# Patient Record
Sex: FEMALE | Race: WHITE | Employment: UNEMPLOYED | URBAN - METROPOLITAN AREA
[De-identification: names, ages, dates, MRNs, and addresses within clinical notes are randomized per-mention and may not be internally consistent; named-entity substitution may affect disease eponyms.]

---
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
## 2020-01-06 LAB
GLUCOSE CHALLENGE: 100 MG/DL
HCT VFR BLD CALC: 29.8 % (ref 36–48)
HEMOGLOBIN: 9.7 G/DL (ref 12–16)
MCH RBC QN AUTO: 28.3 PG (ref 26–34)
MCHC RBC AUTO-ENTMCNC: 32.7 G/DL (ref 31–36)
MCV RBC AUTO: 86.5 FL (ref 80–100)
PDW BLD-RTO: 13.6 % (ref 12.4–15.4)
PLATELET # BLD: 292 K/UL (ref 135–450)
PMV BLD AUTO: 8.6 FL (ref 5–10.5)
RBC # BLD: 3.44 M/UL (ref 4–5.2)
WBC # BLD: 12.1 K/UL (ref 4–11)

## 2020-02-26 LAB
FERRITIN: 19.1 NG/ML (ref 15–150)
HCT VFR BLD CALC: 28.3 % (ref 36–48)
HEMOGLOBIN: 9.3 G/DL (ref 12–16)
MCH RBC QN AUTO: 26.7 PG (ref 26–34)
MCHC RBC AUTO-ENTMCNC: 32.9 G/DL (ref 31–36)
MCV RBC AUTO: 81.1 FL (ref 80–100)
PDW BLD-RTO: 13.7 % (ref 12.4–15.4)
PLATELET # BLD: 292 K/UL (ref 135–450)
PMV BLD AUTO: 8.2 FL (ref 5–10.5)
RBC # BLD: 3.48 M/UL (ref 4–5.2)
WBC # BLD: 14.7 K/UL (ref 4–11)

## 2020-03-01 ENCOUNTER — HOSPITAL ENCOUNTER (OUTPATIENT)
Age: 20
Discharge: HOME | End: 2020-03-01
Attending: OBSTETRICS & GYNECOLOGY | Admitting: OBSTETRICS & GYNECOLOGY
Payer: COMMERCIAL

## 2020-03-01 VITALS
TEMPERATURE: 97.8 F | RESPIRATION RATE: 18 BRPM | SYSTOLIC BLOOD PRESSURE: 113 MMHG | DIASTOLIC BLOOD PRESSURE: 58 MMHG | HEART RATE: 104 BPM

## 2020-03-01 PROCEDURE — 96365 THER/PROPH/DIAG IV INF INIT: CPT

## 2020-03-01 PROCEDURE — 6360000002 HC RX W HCPCS: Performed by: OBSTETRICS & GYNECOLOGY

## 2020-03-01 PROCEDURE — 2580000003 HC RX 258: Performed by: OBSTETRICS & GYNECOLOGY

## 2020-03-01 RX ORDER — SODIUM CHLORIDE 9 MG/ML
INJECTION, SOLUTION INTRAVENOUS CONTINUOUS
Status: DISCONTINUED | OUTPATIENT
Start: 2020-03-01 | End: 2020-03-01 | Stop reason: HOSPADM

## 2020-03-01 RX ADMIN — IRON SUCROSE 200 MG: 20 INJECTION, SOLUTION INTRAVENOUS at 15:55

## 2020-03-01 NOTE — FLOWSHEET NOTE
Late entry [id number], pt. Here for venofer infusion. Does not have order. Order received verbally from dr. Selestino Kehr for venofer infusions 200mg x 5 doses over two weeks. Pt. Tolerated venofer infusion well . No complaints. To return on Tuesday for 2nd infusion.

## 2020-03-26 LAB — GBS, EXTERNAL RESULT: NEGATIVE

## 2020-04-02 LAB
BASOPHILS ABSOLUTE: 0 K/UL (ref 0–0.2)
BASOPHILS RELATIVE PERCENT: 0.2 %
EOSINOPHILS ABSOLUTE: 0.1 K/UL (ref 0–0.6)
EOSINOPHILS RELATIVE PERCENT: 0.7 %
HCT VFR BLD CALC: 29.6 % (ref 36–48)
HEMOGLOBIN: 9.3 G/DL (ref 12–16)
LYMPHOCYTES ABSOLUTE: 2.5 K/UL (ref 1–5.1)
LYMPHOCYTES RELATIVE PERCENT: 19.1 %
MCH RBC QN AUTO: 24.5 PG (ref 26–34)
MCHC RBC AUTO-ENTMCNC: 31.6 G/DL (ref 31–36)
MCV RBC AUTO: 77.7 FL (ref 80–100)
MONOCYTES ABSOLUTE: 0.9 K/UL (ref 0–1.3)
MONOCYTES RELATIVE PERCENT: 6.6 %
NEUTROPHILS ABSOLUTE: 9.4 K/UL (ref 1.7–7.7)
NEUTROPHILS RELATIVE PERCENT: 73.4 %
PDW BLD-RTO: 15.6 % (ref 12.4–15.4)
PLATELET # BLD: 269 K/UL (ref 135–450)
PMV BLD AUTO: 8.8 FL (ref 5–10.5)
RBC # BLD: 3.81 M/UL (ref 4–5.2)
WBC # BLD: 12.8 K/UL (ref 4–11)

## 2020-04-16 ENCOUNTER — HOSPITAL ENCOUNTER (OUTPATIENT)
Age: 20
Discharge: HOME | DRG: 560 | End: 2020-04-16
Attending: OBSTETRICS & GYNECOLOGY | Admitting: OBSTETRICS & GYNECOLOGY
Payer: COMMERCIAL

## 2020-04-16 VITALS
WEIGHT: 201 LBS | TEMPERATURE: 97 F | HEIGHT: 65 IN | HEART RATE: 94 BPM | RESPIRATION RATE: 20 BRPM | BODY MASS INDEX: 33.49 KG/M2 | SYSTOLIC BLOOD PRESSURE: 122 MMHG | DIASTOLIC BLOOD PRESSURE: 83 MMHG

## 2020-04-16 PROBLEM — Z34.90 ENCOUNTER FOR ELECTIVE INDUCTION OF LABOR: Status: ACTIVE | Noted: 2020-04-16

## 2020-04-16 PROCEDURE — 59025 FETAL NON-STRESS TEST: CPT

## 2020-04-16 PROCEDURE — 99211 OFF/OP EST MAY X REQ PHY/QHP: CPT

## 2020-04-16 RX ORDER — FERROUS SULFATE 325(65) MG
325 TABLET ORAL
COMMUNITY
End: 2020-10-11

## 2020-04-16 SDOH — HEALTH STABILITY: MENTAL HEALTH: HOW OFTEN DO YOU HAVE A DRINK CONTAINING ALCOHOL?: NEVER

## 2020-04-17 ENCOUNTER — HOSPITAL ENCOUNTER (INPATIENT)
Age: 20
LOS: 3 days | Discharge: HOME | DRG: 560 | End: 2020-04-20
Attending: OBSTETRICS & GYNECOLOGY | Admitting: OBSTETRICS & GYNECOLOGY
Payer: COMMERCIAL

## 2020-04-17 ENCOUNTER — ANESTHESIA (OUTPATIENT)
Dept: LABOR AND DELIVERY | Age: 20
DRG: 560 | End: 2020-04-17
Payer: COMMERCIAL

## 2020-04-17 ENCOUNTER — ANESTHESIA EVENT (OUTPATIENT)
Dept: LABOR AND DELIVERY | Age: 20
DRG: 560 | End: 2020-04-17
Payer: COMMERCIAL

## 2020-04-17 PROBLEM — Z37.9 NORMAL LABOR: Status: ACTIVE | Noted: 2020-04-17

## 2020-04-17 LAB
ABO/RH: NORMAL
AMPHETAMINE SCREEN, URINE: NORMAL
ANTIBODY SCREEN: NORMAL
BARBITURATE SCREEN URINE: NORMAL
BASOPHILS ABSOLUTE: 0 K/UL (ref 0–0.2)
BASOPHILS RELATIVE PERCENT: 0.3 %
BENZODIAZEPINE SCREEN, URINE: NORMAL
BUPRENORPHINE URINE: NORMAL
CANNABINOID SCREEN URINE: NORMAL
COCAINE METABOLITE SCREEN URINE: NORMAL
EOSINOPHILS ABSOLUTE: 0.1 K/UL (ref 0–0.6)
EOSINOPHILS RELATIVE PERCENT: 0.5 %
HCT VFR BLD CALC: 28.6 % (ref 36–48)
HEMOGLOBIN: 9.1 G/DL (ref 12–16)
LYMPHOCYTES ABSOLUTE: 2.1 K/UL (ref 1–5.1)
LYMPHOCYTES RELATIVE PERCENT: 16.1 %
Lab: NORMAL
MCH RBC QN AUTO: 24.2 PG (ref 26–34)
MCHC RBC AUTO-ENTMCNC: 31.8 G/DL (ref 31–36)
MCV RBC AUTO: 76.3 FL (ref 80–100)
METHADONE SCREEN, URINE: NORMAL
MONOCYTES ABSOLUTE: 0.7 K/UL (ref 0–1.3)
MONOCYTES RELATIVE PERCENT: 5.6 %
NEUTROPHILS ABSOLUTE: 10.2 K/UL (ref 1.7–7.7)
NEUTROPHILS RELATIVE PERCENT: 77.5 %
OPIATE SCREEN URINE: NORMAL
OXYCODONE URINE: NORMAL
PDW BLD-RTO: 17.1 % (ref 12.4–15.4)
PH UA: 6
PHENCYCLIDINE SCREEN URINE: NORMAL
PLATELET # BLD: 253 K/UL (ref 135–450)
PMV BLD AUTO: 9.8 FL (ref 5–10.5)
PROPOXYPHENE SCREEN: NORMAL
RBC # BLD: 3.75 M/UL (ref 4–5.2)
WBC # BLD: 13.2 K/UL (ref 4–11)

## 2020-04-17 PROCEDURE — 80307 DRUG TEST PRSMV CHEM ANLYZR: CPT

## 2020-04-17 PROCEDURE — 3700000025 EPIDURAL BLOCK: Performed by: ANESTHESIOLOGY

## 2020-04-17 PROCEDURE — 86900 BLOOD TYPING SEROLOGIC ABO: CPT

## 2020-04-17 PROCEDURE — 51702 INSERT TEMP BLADDER CATH: CPT

## 2020-04-17 PROCEDURE — 2500000003 HC RX 250 WO HCPCS: Performed by: NURSE ANESTHETIST, CERTIFIED REGISTERED

## 2020-04-17 PROCEDURE — 86850 RBC ANTIBODY SCREEN: CPT

## 2020-04-17 PROCEDURE — 86901 BLOOD TYPING SEROLOGIC RH(D): CPT

## 2020-04-17 PROCEDURE — 6360000002 HC RX W HCPCS: Performed by: OBSTETRICS & GYNECOLOGY

## 2020-04-17 PROCEDURE — 1220000000 HC SEMI PRIVATE OB R&B

## 2020-04-17 PROCEDURE — 85025 COMPLETE CBC W/AUTO DIFF WBC: CPT

## 2020-04-17 PROCEDURE — 2580000003 HC RX 258: Performed by: OBSTETRICS & GYNECOLOGY

## 2020-04-17 PROCEDURE — 86780 TREPONEMA PALLIDUM: CPT

## 2020-04-17 PROCEDURE — 3E033VJ INTRODUCTION OF OTHER HORMONE INTO PERIPHERAL VEIN, PERCUTANEOUS APPROACH: ICD-10-PCS | Performed by: OBSTETRICS & GYNECOLOGY

## 2020-04-17 RX ORDER — SODIUM CHLORIDE 0.9 % (FLUSH) 0.9 %
10 SYRINGE (ML) INJECTION EVERY 12 HOURS SCHEDULED
Status: DISCONTINUED | OUTPATIENT
Start: 2020-04-17 | End: 2020-04-18

## 2020-04-17 RX ORDER — LIDOCAINE HYDROCHLORIDE 15 MG/ML
INJECTION, SOLUTION EPIDURAL; INFILTRATION; INTRACAUDAL; PERINEURAL PRN
Status: DISCONTINUED | OUTPATIENT
Start: 2020-04-17 | End: 2020-04-19 | Stop reason: SDUPTHER

## 2020-04-17 RX ORDER — SODIUM CHLORIDE 0.9 % (FLUSH) 0.9 %
10 SYRINGE (ML) INJECTION PRN
Status: DISCONTINUED | OUTPATIENT
Start: 2020-04-17 | End: 2020-04-18

## 2020-04-17 RX ORDER — SODIUM CHLORIDE, SODIUM LACTATE, POTASSIUM CHLORIDE, CALCIUM CHLORIDE 600; 310; 30; 20 MG/100ML; MG/100ML; MG/100ML; MG/100ML
INJECTION, SOLUTION INTRAVENOUS CONTINUOUS
Status: DISCONTINUED | OUTPATIENT
Start: 2020-04-17 | End: 2020-04-18

## 2020-04-17 RX ORDER — ONDANSETRON 2 MG/ML
4 INJECTION INTRAMUSCULAR; INTRAVENOUS EVERY 6 HOURS PRN
Status: DISCONTINUED | OUTPATIENT
Start: 2020-04-17 | End: 2020-04-18

## 2020-04-17 RX ADMIN — LIDOCAINE HYDROCHLORIDE 2 ML: 15 INJECTION, SOLUTION EPIDURAL; INFILTRATION; INTRACAUDAL; PERINEURAL at 18:26

## 2020-04-17 RX ADMIN — Medication 12 ML/HR: at 18:30

## 2020-04-17 RX ADMIN — LIDOCAINE HYDROCHLORIDE 3 ML: 15 INJECTION, SOLUTION EPIDURAL; INFILTRATION; INTRACAUDAL; PERINEURAL at 18:23

## 2020-04-17 RX ADMIN — Medication 1 MILLI-UNITS/MIN: at 12:00

## 2020-04-17 RX ADMIN — SODIUM CHLORIDE, POTASSIUM CHLORIDE, SODIUM LACTATE AND CALCIUM CHLORIDE: 600; 310; 30; 20 INJECTION, SOLUTION INTRAVENOUS at 21:08

## 2020-04-17 RX ADMIN — SODIUM CHLORIDE, POTASSIUM CHLORIDE, SODIUM LACTATE AND CALCIUM CHLORIDE: 600; 310; 30; 20 INJECTION, SOLUTION INTRAVENOUS at 16:45

## 2020-04-17 ASSESSMENT — PAIN DESCRIPTION - DESCRIPTORS: DESCRIPTORS: CRAMPING

## 2020-04-17 NOTE — ANESTHESIA PROCEDURE NOTES
Epidural Block    Patient location during procedure: OB  Start time: 2020 6:15 PM  Reason for block: labor epidural  Staffing  Anesthesiologist: Terryann Opitz, MD  Resident/CRNA: KRISSY Doherty - CRNA  Performed: resident/CRNA   Preanesthetic Checklist  Completed: patient identified, pre-op evaluation, timeout performed, IV checked, risks and benefits discussed, monitors and equipment checked, anesthesia consent given, oxygen available and patient being monitored  Epidural  Patient position: sitting  Prep: ChloraPrep and x2  Patient monitoring: cardiac monitor, continuous pulse ox and frequent blood pressure checks  Approach: midline  Location: lumbar (1-5)  Injection technique: MERT saline  Provider prep: mask and sterile gloves  Needle  Needle type: Tuohy   Needle gauge: 17 G  Needle length: 3.5 in  Needle insertion depth: 6 cm  Catheter type: side hole  Catheter size: 19 G (20 G)  Catheter at skin depth: 11 cm  Test dose: negative  Assessment  Sensory level: T10  Hemodynamics: stable  Attempts: 1  Additional Notes  Called for labor epidural analgesia request. Medical and Surgical history reviewed with pt. Risks/benefits of epidural discussed including allergic reaction, infection, bleeding, hypotension, headache, back pain, nerve damage, failed or one-sided block. Also discussed anesthesia options and associated risks in the event of . Questions answered. Verbalizes understanding and requests to proceed. FHR:  132  Pt in sitting position. Labor epidural placed using MERT sterile technique (donned mask and sterile gloves). Back prepped with Chloraprepx2. Sterile drape applied. Site: L3-4  MERT:   6 cm. Attempts:  1   Re-directs:  1  Site infiltrated with 1%Lidocaine. 17G Tuohy needle inserted, MERT technique with saline. No heme, CSF, or paresthesias noted. Epidural space dilated with saline. #25ga pencan needle placed through tuohy for dural puncture. +CSF -heme or paresthesia.  Spinal needle

## 2020-04-18 LAB
SARS-COV-2, PCR: NOT DETECTED
TOTAL SYPHILLIS IGG/IGM: NORMAL

## 2020-04-18 PROCEDURE — 6360000002 HC RX W HCPCS: Performed by: OBSTETRICS & GYNECOLOGY

## 2020-04-18 PROCEDURE — 2580000003 HC RX 258: Performed by: OBSTETRICS & GYNECOLOGY

## 2020-04-18 PROCEDURE — 2500000003 HC RX 250 WO HCPCS: Performed by: NURSE ANESTHETIST, CERTIFIED REGISTERED

## 2020-04-18 PROCEDURE — 0HQ9XZZ REPAIR PERINEUM SKIN, EXTERNAL APPROACH: ICD-10-PCS | Performed by: OBSTETRICS & GYNECOLOGY

## 2020-04-18 PROCEDURE — U0002 COVID-19 LAB TEST NON-CDC: HCPCS

## 2020-04-18 PROCEDURE — 6370000000 HC RX 637 (ALT 250 FOR IP): Performed by: OBSTETRICS & GYNECOLOGY

## 2020-04-18 PROCEDURE — 10907ZC DRAINAGE OF AMNIOTIC FLUID, THERAPEUTIC FROM PRODUCTS OF CONCEPTION, VIA NATURAL OR ARTIFICIAL OPENING: ICD-10-PCS | Performed by: OBSTETRICS & GYNECOLOGY

## 2020-04-18 PROCEDURE — 88307 TISSUE EXAM BY PATHOLOGIST: CPT

## 2020-04-18 PROCEDURE — 7200000001 HC VAGINAL DELIVERY

## 2020-04-18 PROCEDURE — 1200000000 HC SEMI PRIVATE

## 2020-04-18 RX ORDER — ACETAMINOPHEN 500 MG
1000 TABLET ORAL 3 TIMES DAILY
Qty: 60 TABLET | Refills: 1 | Status: SHIPPED | OUTPATIENT
Start: 2020-04-18 | End: 2020-10-11

## 2020-04-18 RX ORDER — ACETAMINOPHEN 500 MG
1000 TABLET ORAL EVERY 6 HOURS
Status: DISCONTINUED | OUTPATIENT
Start: 2020-04-18 | End: 2020-04-20 | Stop reason: HOSPADM

## 2020-04-18 RX ORDER — OXYCODONE HYDROCHLORIDE 5 MG/1
10 TABLET ORAL EVERY 4 HOURS PRN
Status: DISCONTINUED | OUTPATIENT
Start: 2020-04-18 | End: 2020-04-20 | Stop reason: HOSPADM

## 2020-04-18 RX ORDER — DOCUSATE SODIUM 100 MG/1
100 CAPSULE, LIQUID FILLED ORAL 2 TIMES DAILY
Status: DISCONTINUED | OUTPATIENT
Start: 2020-04-18 | End: 2020-04-20 | Stop reason: HOSPADM

## 2020-04-18 RX ORDER — LANOLIN 100 %
OINTMENT (GRAM) TOPICAL PRN
Status: DISCONTINUED | OUTPATIENT
Start: 2020-04-18 | End: 2020-04-20 | Stop reason: HOSPADM

## 2020-04-18 RX ORDER — POLYETHYLENE GLYCOL 3350 17 G/17G
17 POWDER, FOR SOLUTION ORAL DAILY PRN
Status: DISCONTINUED | OUTPATIENT
Start: 2020-04-18 | End: 2020-04-20 | Stop reason: HOSPADM

## 2020-04-18 RX ORDER — SODIUM CHLORIDE 0.9 % (FLUSH) 0.9 %
10 SYRINGE (ML) INJECTION PRN
Status: DISCONTINUED | OUTPATIENT
Start: 2020-04-18 | End: 2020-04-20 | Stop reason: HOSPADM

## 2020-04-18 RX ORDER — OXYCODONE HYDROCHLORIDE 5 MG/1
5 TABLET ORAL EVERY 6 HOURS PRN
Qty: 12 TABLET | Refills: 0 | Status: SHIPPED | OUTPATIENT
Start: 2020-04-18 | End: 2020-04-21

## 2020-04-18 RX ORDER — PROMETHAZINE HYDROCHLORIDE 25 MG/ML
12.5 INJECTION, SOLUTION INTRAMUSCULAR; INTRAVENOUS ONCE
Status: COMPLETED | OUTPATIENT
Start: 2020-04-18 | End: 2020-04-18

## 2020-04-18 RX ORDER — FERROUS SULFATE 325(65) MG
325 TABLET ORAL 2 TIMES DAILY WITH MEALS
Status: DISCONTINUED | OUTPATIENT
Start: 2020-04-18 | End: 2020-04-20 | Stop reason: HOSPADM

## 2020-04-18 RX ORDER — SODIUM CHLORIDE 0.9 % (FLUSH) 0.9 %
10 SYRINGE (ML) INJECTION EVERY 12 HOURS SCHEDULED
Status: DISCONTINUED | OUTPATIENT
Start: 2020-04-18 | End: 2020-04-20 | Stop reason: HOSPADM

## 2020-04-18 RX ORDER — ACETAMINOPHEN 500 MG
1000 TABLET ORAL ONCE
Status: COMPLETED | OUTPATIENT
Start: 2020-04-18 | End: 2020-04-18

## 2020-04-18 RX ORDER — ONDANSETRON 2 MG/ML
4 INJECTION INTRAMUSCULAR; INTRAVENOUS EVERY 6 HOURS PRN
Status: DISCONTINUED | OUTPATIENT
Start: 2020-04-18 | End: 2020-04-20 | Stop reason: HOSPADM

## 2020-04-18 RX ORDER — PROMETHAZINE HYDROCHLORIDE 25 MG/ML
12.5 INJECTION, SOLUTION INTRAMUSCULAR; INTRAVENOUS ONCE
Status: DISCONTINUED | OUTPATIENT
Start: 2020-04-18 | End: 2020-04-18

## 2020-04-18 RX ORDER — FERROUS SULFATE 325(65) MG
325 TABLET ORAL
Status: DISCONTINUED | OUTPATIENT
Start: 2020-04-18 | End: 2020-04-20 | Stop reason: HOSPADM

## 2020-04-18 RX ORDER — IBUPROFEN 800 MG/1
800 TABLET ORAL EVERY 8 HOURS
Status: DISCONTINUED | OUTPATIENT
Start: 2020-04-18 | End: 2020-04-20 | Stop reason: HOSPADM

## 2020-04-18 RX ORDER — BUPIVACAINE HYDROCHLORIDE 2.5 MG/ML
INJECTION, SOLUTION EPIDURAL; INFILTRATION; INTRACAUDAL PRN
Status: DISCONTINUED | OUTPATIENT
Start: 2020-04-18 | End: 2020-04-19 | Stop reason: SDUPTHER

## 2020-04-18 RX ORDER — IBUPROFEN 800 MG/1
800 TABLET ORAL EVERY 8 HOURS PRN
Qty: 40 TABLET | Refills: 1 | Status: SHIPPED | OUTPATIENT
Start: 2020-04-18 | End: 2020-10-11

## 2020-04-18 RX ORDER — METHYLERGONOVINE MALEATE 0.2 MG/ML
200 INJECTION INTRAVENOUS
Status: ACTIVE | OUTPATIENT
Start: 2020-04-18 | End: 2020-04-18

## 2020-04-18 RX ORDER — OXYCODONE HYDROCHLORIDE 5 MG/1
5 TABLET ORAL EVERY 4 HOURS PRN
Status: DISCONTINUED | OUTPATIENT
Start: 2020-04-18 | End: 2020-04-20 | Stop reason: HOSPADM

## 2020-04-18 RX ADMIN — DOCUSATE SODIUM 100 MG: 100 CAPSULE ORAL at 08:20

## 2020-04-18 RX ADMIN — ONDANSETRON 4 MG: 2 INJECTION INTRAMUSCULAR; INTRAVENOUS at 00:04

## 2020-04-18 RX ADMIN — FERROUS SULFATE TAB 325 MG (65 MG ELEMENTAL FE) 325 MG: 325 (65 FE) TAB at 07:43

## 2020-04-18 RX ADMIN — ACETAMINOPHEN 1000 MG: 500 TABLET, FILM COATED ORAL at 20:33

## 2020-04-18 RX ADMIN — Medication 14 MILLI-UNITS/MIN: at 03:05

## 2020-04-18 RX ADMIN — ACETAMINOPHEN 1000 MG: 500 TABLET, FILM COATED ORAL at 04:22

## 2020-04-18 RX ADMIN — PROMETHAZINE HYDROCHLORIDE 12.5 MG: 25 INJECTION INTRAMUSCULAR; INTRAVENOUS at 04:30

## 2020-04-18 RX ADMIN — FERROUS SULFATE TAB 325 MG (65 MG ELEMENTAL FE) 325 MG: 325 (65 FE) TAB at 16:29

## 2020-04-18 RX ADMIN — BUPIVACAINE HYDROCHLORIDE 5 ML: 2.5 INJECTION, SOLUTION EPIDURAL; INFILTRATION; INTRACAUDAL; PERINEURAL at 01:14

## 2020-04-18 RX ADMIN — GENTAMICIN SULFATE 132 MG: 40 INJECTION, SOLUTION INTRAMUSCULAR; INTRAVENOUS at 21:01

## 2020-04-18 RX ADMIN — ACETAMINOPHEN 1000 MG: 500 TABLET, FILM COATED ORAL at 14:14

## 2020-04-18 RX ADMIN — DOCUSATE SODIUM 100 MG: 100 CAPSULE ORAL at 20:33

## 2020-04-18 RX ADMIN — ACETAMINOPHEN 1000 MG: 500 TABLET, FILM COATED ORAL at 08:18

## 2020-04-18 RX ADMIN — AMPICILLIN SODIUM 2 G: 2 INJECTION, POWDER, FOR SOLUTION INTRAMUSCULAR; INTRAVENOUS at 04:29

## 2020-04-18 RX ADMIN — AMPICILLIN SODIUM 2 G: 2 INJECTION, POWDER, FOR SOLUTION INTRAMUSCULAR; INTRAVENOUS at 16:28

## 2020-04-18 RX ADMIN — IBUPROFEN 800 MG: 800 TABLET, FILM COATED ORAL at 15:28

## 2020-04-18 RX ADMIN — AMPICILLIN SODIUM 2 G: 2 INJECTION, POWDER, FOR SOLUTION INTRAMUSCULAR; INTRAVENOUS at 22:17

## 2020-04-18 RX ADMIN — GENTAMICIN SULFATE 132 MG: 40 INJECTION, SOLUTION INTRAMUSCULAR; INTRAVENOUS at 05:00

## 2020-04-18 RX ADMIN — AMPICILLIN SODIUM 2 G: 2 INJECTION, POWDER, FOR SOLUTION INTRAMUSCULAR; INTRAVENOUS at 10:28

## 2020-04-18 RX ADMIN — GENTAMICIN SULFATE 132 MG: 40 INJECTION, SOLUTION INTRAMUSCULAR; INTRAVENOUS at 12:33

## 2020-04-18 RX ADMIN — SODIUM CHLORIDE, POTASSIUM CHLORIDE, SODIUM LACTATE AND CALCIUM CHLORIDE: 600; 310; 30; 20 INJECTION, SOLUTION INTRAVENOUS at 03:48

## 2020-04-18 RX ADMIN — IBUPROFEN 800 MG: 800 TABLET, FILM COATED ORAL at 07:43

## 2020-04-18 ASSESSMENT — PAIN SCALES - GENERAL
PAINLEVEL_OUTOF10: 2
PAINLEVEL_OUTOF10: 0
PAINLEVEL_OUTOF10: 2

## 2020-04-18 NOTE — FLOWSHEET NOTE
Patient transferred to postpartum by self and settled into postpartum room. Pt oriented to folder and postpartum care. Oriented to call light, phone and ordering meals. Postpartum RN's name and phone number posted for pt. Siderails up x2. Pt oriented to equipment. Report given to DANELLE Chan RN. Pt included in discussion and all questions answered.

## 2020-04-18 NOTE — FLOWSHEET NOTE
Ibuprofen, acetaminophen, oxycodone prescriptions given, explained and placed in front of binder. All questions answered.

## 2020-04-18 NOTE — FLOWSHEET NOTE
04/18/20 0410   Provider Notification   Provider Name/Title Dr Shelia Etienne of Notification Phone; In Department   Notification Reason Fetal Baseline Change;Labor Status;Uterine Activity; Maternal Vital Sign Change;Status Update     Physician called and notified of maternal temperature of 102.6 axillary & 100.9 oral. Discussed fetal tachycardia with moderate variability, contraction pattern with MVUs adequate, and notified of recent SVE plus prior exam. Notified of nausea and vomiting with zofran 3 hours ago. Orders for antibiotics, tylenol, and phenergan.

## 2020-04-18 NOTE — L&D DELIVERY NOTE
was spontaneous. The uterus was manually explored. Routine cord blood and cord gases were sent. The perineum and vagina were explored and  bilateral labial lacerations were  repaired in standard fashion using 3-0 vicryl.

## 2020-04-19 LAB
HCT VFR BLD CALC: 25.4 % (ref 36–48)
HEMOGLOBIN: 7.9 G/DL (ref 12–16)
MCH RBC QN AUTO: 23.6 PG (ref 26–34)
MCHC RBC AUTO-ENTMCNC: 31.1 G/DL (ref 31–36)
MCV RBC AUTO: 76 FL (ref 80–100)
PDW BLD-RTO: 16.3 % (ref 12.4–15.4)
PLATELET # BLD: 249 K/UL (ref 135–450)
PMV BLD AUTO: 8.7 FL (ref 5–10.5)
RBC # BLD: 3.34 M/UL (ref 4–5.2)
WBC # BLD: 20.7 K/UL (ref 4–11)

## 2020-04-19 PROCEDURE — 6360000002 HC RX W HCPCS: Performed by: OBSTETRICS & GYNECOLOGY

## 2020-04-19 PROCEDURE — 1200000000 HC SEMI PRIVATE

## 2020-04-19 PROCEDURE — 85027 COMPLETE CBC AUTOMATED: CPT

## 2020-04-19 PROCEDURE — 6370000000 HC RX 637 (ALT 250 FOR IP): Performed by: OBSTETRICS & GYNECOLOGY

## 2020-04-19 PROCEDURE — 2580000003 HC RX 258: Performed by: OBSTETRICS & GYNECOLOGY

## 2020-04-19 RX ADMIN — FERROUS SULFATE TAB 325 MG (65 MG ELEMENTAL FE) 325 MG: 325 (65 FE) TAB at 16:59

## 2020-04-19 RX ADMIN — AMPICILLIN SODIUM 2 G: 2 INJECTION, POWDER, FOR SOLUTION INTRAMUSCULAR; INTRAVENOUS at 03:47

## 2020-04-19 RX ADMIN — Medication 10 ML: at 21:28

## 2020-04-19 RX ADMIN — IBUPROFEN 800 MG: 800 TABLET, FILM COATED ORAL at 00:17

## 2020-04-19 RX ADMIN — GENTAMICIN SULFATE 132 MG: 40 INJECTION, SOLUTION INTRAMUSCULAR; INTRAVENOUS at 13:30

## 2020-04-19 RX ADMIN — AMPICILLIN SODIUM 2 G: 2 INJECTION, POWDER, FOR SOLUTION INTRAMUSCULAR; INTRAVENOUS at 11:26

## 2020-04-19 RX ADMIN — GENTAMICIN SULFATE 132 MG: 40 INJECTION, SOLUTION INTRAMUSCULAR; INTRAVENOUS at 05:10

## 2020-04-19 RX ADMIN — ACETAMINOPHEN 1000 MG: 500 TABLET, FILM COATED ORAL at 12:17

## 2020-04-19 RX ADMIN — AMPICILLIN SODIUM 2 G: 2 INJECTION, POWDER, FOR SOLUTION INTRAMUSCULAR; INTRAVENOUS at 16:59

## 2020-04-19 RX ADMIN — Medication 10 ML: at 08:15

## 2020-04-19 RX ADMIN — IBUPROFEN 800 MG: 800 TABLET, FILM COATED ORAL at 23:38

## 2020-04-19 RX ADMIN — ACETAMINOPHEN 1000 MG: 500 TABLET, FILM COATED ORAL at 19:03

## 2020-04-19 RX ADMIN — Medication 10 ML: at 17:54

## 2020-04-19 RX ADMIN — AMPICILLIN SODIUM 2 G: 2 INJECTION, POWDER, FOR SOLUTION INTRAMUSCULAR; INTRAVENOUS at 22:40

## 2020-04-19 RX ADMIN — DOCUSATE SODIUM 100 MG: 100 CAPSULE ORAL at 20:51

## 2020-04-19 RX ADMIN — GENTAMICIN SULFATE 132 MG: 40 INJECTION, SOLUTION INTRAMUSCULAR; INTRAVENOUS at 21:28

## 2020-04-19 RX ADMIN — IBUPROFEN 800 MG: 800 TABLET, FILM COATED ORAL at 16:05

## 2020-04-19 RX ADMIN — FERROUS SULFATE TAB 325 MG (65 MG ELEMENTAL FE) 325 MG: 325 (65 FE) TAB at 08:14

## 2020-04-19 RX ADMIN — DOCUSATE SODIUM 100 MG: 100 CAPSULE ORAL at 08:14

## 2020-04-19 RX ADMIN — ACETAMINOPHEN 1000 MG: 500 TABLET, FILM COATED ORAL at 02:40

## 2020-04-19 RX ADMIN — IBUPROFEN 800 MG: 800 TABLET, FILM COATED ORAL at 08:14

## 2020-04-19 ASSESSMENT — PAIN SCALES - GENERAL
PAINLEVEL_OUTOF10: 0
PAINLEVEL_OUTOF10: 3
PAINLEVEL_OUTOF10: 0
PAINLEVEL_OUTOF10: 1
PAINLEVEL_OUTOF10: 0

## 2020-04-19 NOTE — PROGRESS NOTES
04/19/20 0814    polyethylene glycol (GLYCOLAX) packet 17 g, 17 g, Oral, Daily PRN, Keyana Livingston MD    magnesium hydroxide (MILK OF MAGNESIA) 400 MG/5ML suspension 30 mL, 30 mL, Oral, Daily PRN, Keyana Livingston MD    ondansetron [facility]) injection 4 mg, 4 mg, Intravenous, Q6H PRN, Keyana Livingston MD    ferrous sulfate (IRON 325) tablet 325 mg, 325 mg, Oral, BID WC, Keyana Livingston MD, 325 mg at 04/18/20 1629    lanolin ointment, , Topical, PRN, Keyana Livingston MD    witch hazel-glycerin (TUCKS) pad, , Topical, PRN, Keyana Livingston MD    hydrocortisone 2.5 % cream, , Topical, Q2H PRN, Keyana Livingston MD    benzocaine-menthol (DERMOPLAST) 20-0.5 % spray, , Topical, PRN, Keyana Livingston MD     Assessment/Plan:      Post Partum: Continue Postpartum care  Anemia: stable, on  Po Fe  Intraop fever/Leucocytosis: on IV amp/gent, given WBC count, will continue ABx for another day, recheck CBC in am. Clinically no evidence of infection  Advance care

## 2020-04-20 VITALS
WEIGHT: 194 LBS | HEART RATE: 70 BPM | BODY MASS INDEX: 32.32 KG/M2 | TEMPERATURE: 98.2 F | OXYGEN SATURATION: 99 % | HEIGHT: 65 IN | DIASTOLIC BLOOD PRESSURE: 70 MMHG | SYSTOLIC BLOOD PRESSURE: 112 MMHG | RESPIRATION RATE: 18 BRPM

## 2020-04-20 LAB
BASOPHILS ABSOLUTE: 0.1 K/UL (ref 0–0.2)
BASOPHILS RELATIVE PERCENT: 0.4 %
EOSINOPHILS ABSOLUTE: 0.2 K/UL (ref 0–0.6)
EOSINOPHILS RELATIVE PERCENT: 1.3 %
HCT VFR BLD CALC: 25.8 % (ref 36–48)
HEMOGLOBIN: 8.2 G/DL (ref 12–16)
LYMPHOCYTES ABSOLUTE: 3.6 K/UL (ref 1–5.1)
LYMPHOCYTES RELATIVE PERCENT: 25.9 %
MCH RBC QN AUTO: 24.1 PG (ref 26–34)
MCHC RBC AUTO-ENTMCNC: 31.6 G/DL (ref 31–36)
MCV RBC AUTO: 76.2 FL (ref 80–100)
MONOCYTES ABSOLUTE: 1.1 K/UL (ref 0–1.3)
MONOCYTES RELATIVE PERCENT: 7.8 %
NEUTROPHILS ABSOLUTE: 8.9 K/UL (ref 1.7–7.7)
NEUTROPHILS RELATIVE PERCENT: 64.6 %
PDW BLD-RTO: 17.1 % (ref 12.4–15.4)
PLATELET # BLD: 266 K/UL (ref 135–450)
PMV BLD AUTO: 8.7 FL (ref 5–10.5)
RBC # BLD: 3.38 M/UL (ref 4–5.2)
WBC # BLD: 13.7 K/UL (ref 4–11)

## 2020-04-20 PROCEDURE — 6370000000 HC RX 637 (ALT 250 FOR IP): Performed by: OBSTETRICS & GYNECOLOGY

## 2020-04-20 PROCEDURE — 85025 COMPLETE CBC W/AUTO DIFF WBC: CPT

## 2020-04-20 PROCEDURE — 2580000003 HC RX 258: Performed by: OBSTETRICS & GYNECOLOGY

## 2020-04-20 PROCEDURE — 6360000002 HC RX W HCPCS: Performed by: OBSTETRICS & GYNECOLOGY

## 2020-04-20 RX ADMIN — FERROUS SULFATE TAB 325 MG (65 MG ELEMENTAL FE) 325 MG: 325 (65 FE) TAB at 08:30

## 2020-04-20 RX ADMIN — GENTAMICIN SULFATE 132 MG: 40 INJECTION, SOLUTION INTRAMUSCULAR; INTRAVENOUS at 05:00

## 2020-04-20 RX ADMIN — DOCUSATE SODIUM 100 MG: 100 CAPSULE ORAL at 08:30

## 2020-04-20 RX ADMIN — ACETAMINOPHEN 1000 MG: 500 TABLET, FILM COATED ORAL at 08:30

## 2020-04-20 RX ADMIN — ACETAMINOPHEN 1000 MG: 500 TABLET, FILM COATED ORAL at 02:00

## 2020-04-20 RX ADMIN — AMPICILLIN SODIUM 2 G: 2 INJECTION, POWDER, FOR SOLUTION INTRAMUSCULAR; INTRAVENOUS at 04:30

## 2020-04-20 RX ADMIN — IBUPROFEN 800 MG: 800 TABLET, FILM COATED ORAL at 07:27

## 2020-04-20 ASSESSMENT — PAIN SCALES - GENERAL
PAINLEVEL_OUTOF10: 0

## 2020-04-20 NOTE — PROGRESS NOTES
[facility] post-partum note    Post-partum Day #2    Subjective:    No c/o  Lochia: Normal    Objective:  Vitals:    04/19/20 1600 04/19/20 2339 04/20/20 0645 04/20/20 0830   BP: 110/68 106/66 106/68    Pulse: 76 74 76    Resp: 18 18 16    Temp: 97.9 °F (36.6 °C) 97.9 °F (36.6 °C) 98 °F (36.7 °C) 98 °F (36.7 °C)   TempSrc: Oral Oral Oral Oral   SpO2:       Weight:       Height:           Physical Examination:  Appears well  Uterus: Firm, NT  Calves: NT    Labs:    Recent Labs     04/17/20  1115 04/19/20  0800 04/20/20  0630   WBC 13.2* 20.7* 13.7*   HGB 9.1* 7.9* 8.2*   HCT 28.6* 25.4* 25.8*    249 266         Current Facility-Administered Medications:     ampicillin 2 g ivpb mini bag, 2 g, Intravenous, 4 times per day, Misty Oro MD, Last Rate: 200 mL/hr at 04/20/20 0430, 2 g at 04/20/20 0430    gentamicin (GARAMYCIN) 132 mg in dextrose 5 % 100 mL IVPB, 1.5 mg/kg, Intravenous, Q8H, Misty Oro MD, Last Rate: 200 mL/hr at 04/20/20 0500, 132 mg at 04/20/20 0500    ferrous sulfate (IRON 325) tablet 325 mg, 325 mg, Oral, Daily with breakfast, Misty Oro MD, 325 mg at 04/20/20 0830    sodium chloride flush 0.9 % injection 10 mL, 10 mL, Intravenous, 2 times per day, Misty Oro MD, 10 mL at 04/19/20 2128    sodium chloride flush 0.9 % injection 10 mL, 10 mL, Intravenous, PRN, Misty Oro MD, 10 mL at 04/19/20 1754    acetaminophen (TYLENOL) tablet 1,000 mg, 1,000 mg, Oral, Q6H, Misty Oro MD, 1,000 mg at 04/20/20 0830    ibuprofen (ADVIL;MOTRIN) tablet 800 mg, 800 mg, Oral, Q8H, Misty Oro MD, 800 mg at 04/20/20 0727    oxyCODONE (ROXICODONE) immediate release tablet 5 mg, 5 mg, Oral, Q4H PRN **OR** oxyCODONE (ROXICODONE) immediate release tablet 10 mg, 10 mg, Oral, Q4H PRN, Misty Oro MD    docusate sodium (COLACE) capsule 100 mg, 100 mg, Oral, BID, Misty Oro MD, 100 mg at 04/20/20 0830    polyethylene glycol (GLYCOLAX) packet 17 g, 17 g, Oral, Daily PRN, Justin Morfin MD    magnesium hydroxide (MILK OF MAGNESIA) 400 MG/5ML suspension 30 mL, 30 mL, Oral, Daily PRN, Justin Morfin MD    ondansetron [facility] injection 4 mg, 4 mg, Intravenous, Q6H PRN, Justin Morfin MD    ferrous sulfate (IRON 325) tablet 325 mg, 325 mg, Oral, BID WC, Justin Morfin MD, 325 mg at 04/19/20 8559    lanolin ointment, , Topical, PRN, Justin Morfin MD    witch hazel-glycerin (TUCKS) pad, , Topical, PRN, Justin Morfin MD    hydrocortisone 2.5 % cream, , Topical, Q2H PRN, Justin Morfin MD    benzocaine-menthol (DERMOPLAST) 20-0.5 % spray, , Topical, PRN, Justin Morfin MD     Assessment/Plan:      Post Partum: stable Postpartum care  FUO: resolution of leukocytosis, AF x 24 hours, D/C home

## 2020-04-20 NOTE — FLOWSHEET NOTE
Postpartum and infant care teaching completed and forms signed by patient. Copy witnessed by RN and given to patient. Patient verbalized understanding of all teaching points. Prescriptions given if applicable. Patient plans to follow-up with [facility] Provider as instructed. Patient verbalizes understanding of discharge instructions and denies further questions. ID bands checked. Mother's ID band and one of baby's ID bands removed and taped to footprint sheet, signed by patient and witnessed by RN. Patient discharged in stable condition accompanied by family/guardian. Discharged in wheelchair, holding baby in arms.

## 2020-04-23 ENCOUNTER — FOLLOWUP TELEPHONE ENCOUNTER (OUTPATIENT)
Dept: OBGYN | Age: 20
End: 2020-04-23

## 2020-10-11 ENCOUNTER — HOSPITAL ENCOUNTER (EMERGENCY)
Age: 20
Discharge: HOME | End: 2020-10-11
Attending: STUDENT IN AN ORGANIZED HEALTH CARE EDUCATION/TRAINING PROGRAM
Payer: COMMERCIAL

## 2020-10-11 ENCOUNTER — APPOINTMENT (OUTPATIENT)
Dept: CT IMAGING | Age: 20
End: 2020-10-11
Payer: COMMERCIAL

## 2020-10-11 VITALS
HEIGHT: 65 IN | BODY MASS INDEX: 29.16 KG/M2 | HEART RATE: 85 BPM | WEIGHT: 175 LBS | SYSTOLIC BLOOD PRESSURE: 123 MMHG | OXYGEN SATURATION: 99 % | TEMPERATURE: 97 F | DIASTOLIC BLOOD PRESSURE: 83 MMHG | RESPIRATION RATE: 16 BRPM

## 2020-10-11 LAB
A/G RATIO: 1.2 (ref 1.1–2.2)
ALBUMIN SERPL-MCNC: 4.1 G/DL (ref 3.4–5)
ALP BLD-CCNC: 70 U/L (ref 40–129)
ALT SERPL-CCNC: 22 U/L (ref 10–40)
ANION GAP SERPL CALCULATED.3IONS-SCNC: 10 MMOL/L (ref 3–16)
AST SERPL-CCNC: 19 U/L (ref 15–37)
BASOPHILS ABSOLUTE: 0.2 K/UL (ref 0–0.2)
BASOPHILS RELATIVE PERCENT: 1.9 %
BILIRUB SERPL-MCNC: <0.2 MG/DL (ref 0–1)
BUN BLDV-MCNC: 10 MG/DL (ref 7–20)
CALCIUM SERPL-MCNC: 9.2 MG/DL (ref 8.3–10.6)
CHLORIDE BLD-SCNC: 106 MMOL/L (ref 99–110)
CO2: 24 MMOL/L (ref 21–32)
CREAT SERPL-MCNC: 0.8 MG/DL (ref 0.6–1.1)
EOSINOPHILS ABSOLUTE: 0.1 K/UL (ref 0–0.6)
EOSINOPHILS RELATIVE PERCENT: 1.2 %
GFR AFRICAN AMERICAN: >60
GFR NON-AFRICAN AMERICAN: >60
GLOBULIN: 3.3 G/DL
GLUCOSE BLD-MCNC: 107 MG/DL (ref 70–99)
HCG QUALITATIVE: NEGATIVE
HCT VFR BLD CALC: 35.7 % (ref 36–48)
HEMOGLOBIN: 11.6 G/DL (ref 12–16)
LIPASE: 50 U/L (ref 13–60)
LYMPHOCYTES ABSOLUTE: 3.4 K/UL (ref 1–5.1)
LYMPHOCYTES RELATIVE PERCENT: 34.2 %
MCH RBC QN AUTO: 25.4 PG (ref 26–34)
MCHC RBC AUTO-ENTMCNC: 32.5 G/DL (ref 31–36)
MCV RBC AUTO: 78.2 FL (ref 80–100)
MONOCYTES ABSOLUTE: 0.8 K/UL (ref 0–1.3)
MONOCYTES RELATIVE PERCENT: 8.1 %
NEUTROPHILS ABSOLUTE: 5.4 K/UL (ref 1.7–7.7)
NEUTROPHILS RELATIVE PERCENT: 54.6 %
PDW BLD-RTO: 16.4 % (ref 12.4–15.4)
PLATELET # BLD: 312 K/UL (ref 135–450)
PMV BLD AUTO: 8 FL (ref 5–10.5)
POTASSIUM SERPL-SCNC: 3.8 MMOL/L (ref 3.5–5.1)
RBC # BLD: 4.57 M/UL (ref 4–5.2)
SODIUM BLD-SCNC: 140 MMOL/L (ref 136–145)
TOTAL PROTEIN: 7.4 G/DL (ref 6.4–8.2)
WBC # BLD: 9.9 K/UL (ref 4–11)

## 2020-10-11 PROCEDURE — 6360000004 HC RX CONTRAST MEDICATION: Performed by: NURSE PRACTITIONER

## 2020-10-11 PROCEDURE — 85025 COMPLETE CBC W/AUTO DIFF WBC: CPT

## 2020-10-11 PROCEDURE — 99284 EMERGENCY DEPT VISIT MOD MDM: CPT

## 2020-10-11 PROCEDURE — 84703 CHORIONIC GONADOTROPIN ASSAY: CPT

## 2020-10-11 PROCEDURE — 74177 CT ABD & PELVIS W/CONTRAST: CPT

## 2020-10-11 PROCEDURE — 96375 TX/PRO/DX INJ NEW DRUG ADDON: CPT

## 2020-10-11 PROCEDURE — 96374 THER/PROPH/DIAG INJ IV PUSH: CPT

## 2020-10-11 PROCEDURE — 83690 ASSAY OF LIPASE: CPT

## 2020-10-11 PROCEDURE — 6360000002 HC RX W HCPCS: Performed by: NURSE PRACTITIONER

## 2020-10-11 PROCEDURE — 80053 COMPREHEN METABOLIC PANEL: CPT

## 2020-10-11 PROCEDURE — 2580000003 HC RX 258: Performed by: NURSE PRACTITIONER

## 2020-10-11 RX ORDER — ONDANSETRON 2 MG/ML
4 INJECTION INTRAMUSCULAR; INTRAVENOUS EVERY 30 MIN PRN
Status: DISCONTINUED | OUTPATIENT
Start: 2020-10-11 | End: 2020-10-11 | Stop reason: HOSPADM

## 2020-10-11 RX ORDER — AMOXICILLIN AND CLAVULANATE POTASSIUM 875; 125 MG/1; MG/1
1 TABLET, FILM COATED ORAL 2 TIMES DAILY
Qty: 20 TABLET | Refills: 0 | Status: SHIPPED | OUTPATIENT
Start: 2020-10-11 | End: 2020-10-21

## 2020-10-11 RX ORDER — KETOROLAC TROMETHAMINE 30 MG/ML
30 INJECTION, SOLUTION INTRAMUSCULAR; INTRAVENOUS ONCE
Status: COMPLETED | OUTPATIENT
Start: 2020-10-11 | End: 2020-10-11

## 2020-10-11 RX ORDER — 0.9 % SODIUM CHLORIDE 0.9 %
1000 INTRAVENOUS SOLUTION INTRAVENOUS ONCE
Status: COMPLETED | OUTPATIENT
Start: 2020-10-11 | End: 2020-10-11

## 2020-10-11 RX ADMIN — ONDANSETRON 4 MG: 2 INJECTION INTRAMUSCULAR; INTRAVENOUS at 02:14

## 2020-10-11 RX ADMIN — IOPAMIDOL 75 ML: 755 INJECTION, SOLUTION INTRAVENOUS at 03:02

## 2020-10-11 RX ADMIN — KETOROLAC TROMETHAMINE 30 MG: 30 INJECTION, SOLUTION INTRAMUSCULAR at 02:14

## 2020-10-11 RX ADMIN — SODIUM CHLORIDE 1000 ML: 9 INJECTION, SOLUTION INTRAVENOUS at 02:14

## 2020-10-11 ASSESSMENT — ENCOUNTER SYMPTOMS
ABDOMINAL PAIN: 1
SHORTNESS OF BREATH: 0
VOMITING: 0
CHEST TIGHTNESS: 0
NAUSEA: 1
DIARRHEA: 0

## 2020-10-11 ASSESSMENT — PAIN SCALES - GENERAL: PAINLEVEL_OUTOF10: 10

## 2020-10-11 NOTE — ED PROVIDER NOTES
MidLevel Attestation   I havepersonally performed and/or participated in the history, exam and medical decision making and agree with all pertinent clinical information. I have also reviewed and agree with the past medical, family and social historyunless otherwise noted. I have personally performed a face to face diagnostic evaluation onthis patient. I have reviewed the mid-levels findings and agree. In brief, Jacki Jamil is a 21 y.o. female that presented to the emergency department abdominal pain. Work up showed acute cholecystitis. Surgery consulted and recommended dc home with out pt follow up.     I have reviewed and interpreted all of the currently available lab results and diagnostics from this visit:  Results for orders placed or performed during the hospital encounter of 10/11/20   CBC Auto Differential   Result Value Ref Range    WBC 9.9 4.0 - 11.0 K/uL    RBC 4.57 4.00 - 5.20 M/uL    Hemoglobin 11.6 (L) 12.0 - 16.0 g/dL    Hematocrit 35.7 (L) 36.0 - 48.0 %    MCV 78.2 (L) 80.0 - 100.0 fL    MCH 25.4 (L) 26.0 - 34.0 pg    MCHC 32.5 31.0 - 36.0 g/dL    RDW 16.4 (H) 12.4 - 15.4 %    Platelets 484 350 - 201 K/uL    MPV 8.0 5.0 - 10.5 fL    Neutrophils % 54.6 %    Lymphocytes % 34.2 %    Monocytes % 8.1 %    Eosinophils % 1.2 %    Basophils % 1.9 %    Neutrophils Absolute 5.4 1.7 - 7.7 K/uL    Lymphocytes Absolute 3.4 1.0 - 5.1 K/uL    Monocytes Absolute 0.8 0.0 - 1.3 K/uL    Eosinophils Absolute 0.1 0.0 - 0.6 K/uL    Basophils Absolute 0.2 0.0 - 0.2 K/uL   Comprehensive metabolic panel   Result Value Ref Range    Sodium 140 136 - 145 mmol/L    Potassium 3.8 3.5 - 5.1 mmol/L    Chloride 106 99 - 110 mmol/L    CO2 24 21 - 32 mmol/L    Anion Gap 10 3 - 16    Glucose 107 (H) 70 - 99 mg/dL    BUN 10 7 - 20 mg/dL    CREATININE 0.8 0.6 - 1.1 mg/dL    GFR Non-African American >60 >60    GFR African American >60 >60    Calcium 9.2 8.3 - 10.6 mg/dL    Total Protein 7.4 6.4 - 8.2 g/dL    Alb 4.1 3.4 - 5.0 g/dL Albumin/Globulin Ratio 1.2 1.1 - 2.2    Total Bilirubin <0.2 0.0 - 1.0 mg/dL    Alkaline Phosphatase 70 40 - 129 U/L    ALT 22 10 - 40 U/L    AST 19 15 - 37 U/L    Globulin 3.3 g/dL   Lipase   Result Value Ref Range    Lipase 50.0 13.0 - 60.0 U/L   HCG Qualitative, Serum   Result Value Ref Range    hCG Qual Negative Detects HCG level >10 MIU/mL     No results found. ED Medication Orders (From admission, onward)    Start Ordered     Status Ordering Provider    10/11/20 0258 10/11/20 0258  iopamidol (ISOVUE-370) 76 % injection 75 mL  IMG ONCE PRN      Last MAR action:  Given - by Winnie De on 10/11/20 at Lyndsay Professor Gabriel Brandt Karena 298    10/11/20 0200 10/11/20 0146  ketorolac (TORADOL) injection 30 mg  ONCE      Last MAR action:  Given - by Catherene Oddi on 10/11/20 at 0214 Jason CASTELLANOS    10/11/20 0200 10/11/20 0146  0.9 % sodium chloride bolus  ONCE      Last MAR action:  Stopped - by Catherene Oddi on 10/11/20 at [address] CASTELLANOS          Final Impression      1. Acute cholecystitis        DISPOSITION Decision To Discharge 10/11/2020 05:22:33 AM       This record is transcribed utilizing voice recognition technology. There are inherent limitations in this technology. In addition, there may be limitations in editing of this report. If there are any discrepancies, please contact me directly.     Ana Montague MD   10/14/2020             Francine Robert MD  10/14/20 7785

## 2020-10-11 NOTE — ED PROVIDER NOTES
[facility]        Pt Name: Jolene Griggs  MRN: [id number]  [place] 2000  Date of evaluation: 10/11/2020  Provider: KRISSY Trent CNP  PCP: No primary care provider on file. I have seen and evaluated this patient with my supervising physician Leonora Arvizu MD.    [address] [place] [address]       Chief Complaint   Patient presents with    Abdominal Pain     RLQ pain for a few days but worse tonight. pt. states a little nausea no v/d/       HISTORY OF PRESENT ILLNESS   (Location, Timing/Onset, Context/Setting, Quality, Duration, Modifying Factors, Severity, Associated Signs and Symptoms)  Note limiting factors. Jolene Griggs is a 21 y.o. female presents to the emergency department complaining of sharp/stabbing pain to the right low pelvis. Denies vaginal bleeding or discharge. No concern for STD. Reports normal menstrual cycle with first day of bleeding on 9/27/2020. Denies history of abdominal pain or surgeries. Reports slight nausea without vomiting. Denies any headache, fever, lightheadedness, dizziness, visual disturbances. No chest pain or pressure. No neck or back pain. No shortness of breath, cough, or congestion. No vomiting, diarrhea, constipation, or dysuria. No rash. Nursing Notes were all reviewed and agreed with or any disagreements were addressed in the HPI. REVIEW OF SYSTEMS    (2-9 systems for level 4, 10 or more for level 5)     Review of Systems   Constitutional: Negative for activity change, chills and fever. Respiratory: Negative for chest tightness and shortness of breath. Cardiovascular: Negative for chest pain. Gastrointestinal: Positive for abdominal pain and nausea. Negative for diarrhea and vomiting. Genitourinary: Negative for dysuria. All other systems reviewed and are negative. Positives and Pertinent negatives as per HPI.   Except as noted above in the ROS, all other systems were reviewed and negative. PAST MEDICAL HISTORY     Past Medical History:   Diagnosis Date    Anemia     taking iron         SURGICAL HISTORY     Past Surgical History:   Procedure Laterality Date    TONSILLECTOMY           CURRENTMEDICATIONS       Discharge Medication List as of 10/11/2020  5:32 AM            ALLERGIES     Patient has no known allergies. FAMILYHISTORY       Family History   Problem Relation Age of Onset    Diabetes Father           SOCIAL HISTORY       Social History     Tobacco Use    Smoking status: Former Smoker    Smokeless tobacco: Never Used   Substance Use Topics    Alcohol use: Never     Frequency: Never    Drug use: Never       SCREENINGS             PHYSICAL EXAM    (up to 7 for level 4, 8 or more for level 5)     ED Triage Vitals [10/11/20 0131]   BP Temp Temp Source Pulse Resp SpO2 Height Weight   123/83 97 °F (36.1 °C) Oral 85 16 99 % 5' 5\" (1.651 m) 175 lb (79.4 kg)       Physical Exam  Vitals signs and nursing note reviewed. Constitutional:       Appearance: She is well-developed. She is not diaphoretic. HENT:      Head: Normocephalic and atraumatic. Right Ear: External ear normal.      Left Ear: External ear normal.   Eyes:      General:         Right eye: No discharge. Left eye: No discharge. Neck:      Musculoskeletal: Normal range of motion and neck supple. Vascular: No JVD. Cardiovascular:      Rate and Rhythm: Normal rate and regular rhythm. Pulses: Normal pulses. Heart sounds: Normal heart sounds. Pulmonary:      Effort: Pulmonary effort is normal. No respiratory distress. Breath sounds: Normal breath sounds. Abdominal:      Palpations: Abdomen is soft. Tenderness: There is abdominal tenderness. Musculoskeletal: Normal range of motion. Skin:     General: Skin is warm and dry. Coloration: Skin is not pale.    Neurological:      Mental Status: She is alert and oriented to person, place, and time.   Psychiatric:         Behavior: Behavior normal.         DIAGNOSTIC RESULTS   LABS:    Labs Reviewed   CBC WITH AUTO DIFFERENTIAL - Abnormal; Notable for the following components:       Result Value    Hemoglobin 11.6 (*)     Hematocrit 35.7 (*)     MCV 78.2 (*)     MCH 25.4 (*)     RDW 16.4 (*)     All other components within normal limits    Narrative:     Performed at:  [facility] [address], [address]   Phone [phone]   COMPREHENSIVE METABOLIC PANEL - Abnormal; Notable for the following components:    Glucose 107 (*)     All other components within normal limits    Narrative:     Performed at:  [facility] [address], [address]   Phone [phone]   LIPASE    Narrative:     Performed at:  [facility] [address], [address]   Phone [phone]   HCG, SERUM, QUALITATIVE    Narrative:     Performed at:  [facility] [address] [facility] [address]   Phone [phone]   URINE RT REFLEX TO CULTURE       All other labs were within normal range or not returned as of this dictation. EKG: All EKG's are interpreted by the Emergency Department Physician in the absence of a cardiologist.  Please see their note for interpretation of EKG. RADIOLOGY:   Non-plain film images such as CT, Ultrasound and MRI are read by the radiologist. Plain radiographic images are visualized and preliminarily interpreted by the ED Provider with the below findings:        Interpretation per the Radiologist below, if available at the time of this note:    CT ABDOMEN PELVIS W IV CONTRAST Additional Contrast? None   Final Result   1. Suspected acute cholecystitis. 2. The areas of low-attenuation in the liver are favored to represent focal   fatty infiltration.   Liver protocol MRI could be obtained for further   evaluation if clinically warranted. US GALLBLADDER RUQ    (Results Pending)     No results found. PROCEDURES   Unless otherwise noted below, none     Procedures    CRITICAL CARE TIME   N/A    CONSULTS:  IP CONSULT TO GENERAL SURGERY      EMERGENCY DEPARTMENT COURSE and DIFFERENTIAL DIAGNOSIS/MDM:   Vitals:    Vitals:    10/11/20 0131   BP: 123/83   Pulse: 85   Resp: 16   Temp: 97 °F (36.1 °C)   TempSrc: Oral   SpO2: 99%   Weight: 175 lb (79.4 kg)   Height: 5' 5\" (1.651 m)       Patient was given the following medications:  Medications   ketorolac (TORADOL) injection 30 mg (30 mg Intravenous Given 10/11/20 0214)   0.9 % sodium chloride bolus (0 mLs Intravenous Stopped 10/11/20 0559)   iopamidol (ISOVUE-370) 76 % injection 75 mL (75 mLs Intravenous Given 10/11/20 0302)           Briefly, this is a 21year old female that  presents to the emergency department complaining of sharp/stabbing pain to the right low pelvis. Denies vaginal bleeding or discharge. No concern for STD. Reports normal menstrual cycle with first day of bleeding on 9/27/2020. Denies history of abdominal pain or surgeries. Reports slight nausea without vomiting. Patient was given Toradol, Zofran, IV fluids. She is pain and symptom-free. Labs are unremarkable, normal hepatic function, no elevation in white blood cell count. Vital signs are unremarkable, the patient is afebrile and not tachycardic. CT ABDOMEN PELVIS W IV CONTRAST Additional Contrast? None (Final result)   Result time 10/11/20 03:29:13   Final result by Iggy Noriega MD (10/11/20 03:29:13)                 Impression:     1. Suspected acute cholecystitis. 2. The areas of low-attenuation in the liver are favored to represent focal   fatty infiltration.  Liver protocol MRI could be obtained for further   evaluation if clinically warranted.                I did talk with Dr. Gaston Joseph via telephone, we did discuss the patient's presentation as well as reason for arrival and CT imaging. He is agreeable with this patient can be discharged home if she remains pain and symptom-free with Augmentin, ultrasound to be completed Monday outpatient, and follow-up with him in the office on Tuesday. Patient be dispositioned by attending physician as his visit does exceed the length of my shift. FINAL IMPRESSION      1.  Acute cholecystitis          DISPOSITION/PLAN   DISPOSITION        PATIENT REFERREDTO:  Ramiro Bright MD  [address]  [phone]    Go in 3 days      [facility]) Pre-Services  [phone]          DISCHARGE MEDICATIONS:  Discharge Medication List as of 10/11/2020  5:32 AM      START taking these medications    Details   amoxicillin-clavulanate (AUGMENTIN) 875-125 MG per tablet Take 1 tablet by mouth 2 times daily for 10 days, Disp-20 tablet,R-0Print             DISCONTINUED MEDICATIONS:  Discharge Medication List as of 10/11/2020  5:32 AM      STOP taking these medications       ibuprofen (ADVIL;MOTRIN) 800 MG tablet Comments:   Reason for Stopping:         acetaminophen (TYLENOL) 500 MG tablet Comments:   Reason for Stopping:         ferrous sulfate (IRON 325) 325 (65 Fe) MG tablet Comments:   Reason for Stopping:         Prenatal MV-Min-Fe Fum-FA-DHA (PRENATAL 1 PO) Comments:   Reason for Stopping:                      (Please note that portions of this note were completed with a voice recognition program.  Efforts were made to edit the dictations but occasionally words are mis-transcribed.)    KRISSY Hobbs CNP (electronically signed)           KRISSY Hobbs CNP  10/11/20 480 KRISSY Madera CNP  10/11/20 181

## 2021-03-10 ENCOUNTER — HOSPITAL ENCOUNTER (EMERGENCY)
Age: 21
Discharge: HOME | End: 2021-03-11
Payer: COMMERCIAL

## 2021-03-10 ENCOUNTER — APPOINTMENT (OUTPATIENT)
Dept: ULTRASOUND IMAGING | Age: 21
End: 2021-03-10
Payer: COMMERCIAL

## 2021-03-10 ENCOUNTER — APPOINTMENT (OUTPATIENT)
Dept: CT IMAGING | Age: 21
End: 2021-03-10
Payer: COMMERCIAL

## 2021-03-10 VITALS
OXYGEN SATURATION: 97 % | HEIGHT: 65 IN | WEIGHT: 189.6 LBS | TEMPERATURE: 98.7 F | DIASTOLIC BLOOD PRESSURE: 68 MMHG | RESPIRATION RATE: 16 BRPM | HEART RATE: 96 BPM | BODY MASS INDEX: 31.59 KG/M2 | SYSTOLIC BLOOD PRESSURE: 130 MMHG

## 2021-03-10 DIAGNOSIS — K81.9 CHOLECYSTITIS: Primary | ICD-10-CM

## 2021-03-10 LAB
A/G RATIO: 1.3 (ref 1.1–2.2)
ALBUMIN SERPL-MCNC: 4.3 G/DL (ref 3.4–5)
ALP BLD-CCNC: 60 U/L (ref 40–129)
ALT SERPL-CCNC: 25 U/L (ref 10–40)
ANION GAP SERPL CALCULATED.3IONS-SCNC: 8 MMOL/L (ref 3–16)
AST SERPL-CCNC: 19 U/L (ref 15–37)
BACTERIA: ABNORMAL /HPF
BASOPHILS ABSOLUTE: 0 K/UL (ref 0–0.2)
BASOPHILS RELATIVE PERCENT: 0.4 %
BILIRUB SERPL-MCNC: <0.2 MG/DL (ref 0–1)
BILIRUBIN URINE: NEGATIVE
BLOOD, URINE: NEGATIVE
BUN BLDV-MCNC: 11 MG/DL (ref 7–20)
CALCIUM SERPL-MCNC: 9.3 MG/DL (ref 8.3–10.6)
CHLORIDE BLD-SCNC: 106 MMOL/L (ref 99–110)
CLARITY: ABNORMAL
CO2: 24 MMOL/L (ref 21–32)
COLOR: YELLOW
CREAT SERPL-MCNC: 0.7 MG/DL (ref 0.6–1.1)
EOSINOPHILS ABSOLUTE: 0.1 K/UL (ref 0–0.6)
EOSINOPHILS RELATIVE PERCENT: 0.9 %
EPITHELIAL CELLS, UA: 7 /HPF (ref 0–5)
GFR AFRICAN AMERICAN: >60
GFR NON-AFRICAN AMERICAN: >60
GLOBULIN: 3.4 G/DL
GLUCOSE BLD-MCNC: 118 MG/DL (ref 70–99)
GLUCOSE URINE: NEGATIVE MG/DL
HCG QUALITATIVE: NEGATIVE
HCT VFR BLD CALC: 35.3 % (ref 36–48)
HEMOGLOBIN: 11.9 G/DL (ref 12–16)
HYALINE CASTS: 0 /LPF (ref 0–8)
KETONES, URINE: NEGATIVE MG/DL
LEUKOCYTE ESTERASE, URINE: ABNORMAL
LIPASE: 58 U/L (ref 13–60)
LYMPHOCYTES ABSOLUTE: 2.8 K/UL (ref 1–5.1)
LYMPHOCYTES RELATIVE PERCENT: 33.8 %
MCH RBC QN AUTO: 26.9 PG (ref 26–34)
MCHC RBC AUTO-ENTMCNC: 33.7 G/DL (ref 31–36)
MCV RBC AUTO: 79.7 FL (ref 80–100)
MICROSCOPIC EXAMINATION: YES
MONOCYTES ABSOLUTE: 0.7 K/UL (ref 0–1.3)
MONOCYTES RELATIVE PERCENT: 8.5 %
NEUTROPHILS ABSOLUTE: 4.7 K/UL (ref 1.7–7.7)
NEUTROPHILS RELATIVE PERCENT: 56.4 %
NITRITE, URINE: NEGATIVE
PDW BLD-RTO: 16.2 % (ref 12.4–15.4)
PH UA: 7.5 (ref 5–8)
PLATELET # BLD: 266 K/UL (ref 135–450)
PMV BLD AUTO: 7.5 FL (ref 5–10.5)
POTASSIUM SERPL-SCNC: 4.1 MMOL/L (ref 3.5–5.1)
PROTEIN UA: NEGATIVE MG/DL
RBC # BLD: 4.43 M/UL (ref 4–5.2)
RBC UA: 3 /HPF (ref 0–4)
SODIUM BLD-SCNC: 138 MMOL/L (ref 136–145)
SPECIFIC GRAVITY UA: 1.02 (ref 1–1.03)
TOTAL PROTEIN: 7.7 G/DL (ref 6.4–8.2)
URINE REFLEX TO CULTURE: ABNORMAL
URINE TYPE: ABNORMAL
UROBILINOGEN, URINE: 0.2 E.U./DL
WBC # BLD: 8.3 K/UL (ref 4–11)
WBC UA: 9 /HPF (ref 0–5)

## 2021-03-10 PROCEDURE — 84703 CHORIONIC GONADOTROPIN ASSAY: CPT

## 2021-03-10 PROCEDURE — 83690 ASSAY OF LIPASE: CPT

## 2021-03-10 PROCEDURE — 74176 CT ABD & PELVIS W/O CONTRAST: CPT

## 2021-03-10 PROCEDURE — 6370000000 HC RX 637 (ALT 250 FOR IP): Performed by: PHYSICIAN ASSISTANT

## 2021-03-10 PROCEDURE — 99283 EMERGENCY DEPT VISIT LOW MDM: CPT

## 2021-03-10 PROCEDURE — 80053 COMPREHEN METABOLIC PANEL: CPT

## 2021-03-10 PROCEDURE — 85025 COMPLETE CBC W/AUTO DIFF WBC: CPT

## 2021-03-10 PROCEDURE — 76705 ECHO EXAM OF ABDOMEN: CPT

## 2021-03-10 PROCEDURE — 36415 COLL VENOUS BLD VENIPUNCTURE: CPT

## 2021-03-10 PROCEDURE — 81001 URINALYSIS AUTO W/SCOPE: CPT

## 2021-03-10 RX ORDER — FAMOTIDINE 20 MG/1
40 TABLET, FILM COATED ORAL ONCE
Status: COMPLETED | OUTPATIENT
Start: 2021-03-10 | End: 2021-03-10

## 2021-03-10 RX ADMIN — FAMOTIDINE 40 MG: 20 TABLET ORAL at 23:07

## 2021-03-10 RX ADMIN — LIDOCAINE HYDROCHLORIDE: 20 SOLUTION ORAL; TOPICAL at 23:07

## 2021-03-10 ASSESSMENT — ENCOUNTER SYMPTOMS
VOMITING: 0
WHEEZING: 0
ABDOMINAL PAIN: 1
NAUSEA: 1
RHINORRHEA: 0
SHORTNESS OF BREATH: 0
COUGH: 0
DIARRHEA: 0

## 2021-03-10 ASSESSMENT — PAIN DESCRIPTION - FREQUENCY: FREQUENCY: CONTINUOUS

## 2021-03-10 ASSESSMENT — PAIN SCALES - GENERAL: PAINLEVEL_OUTOF10: 7

## 2021-03-10 ASSESSMENT — PAIN DESCRIPTION - ORIENTATION: ORIENTATION: RIGHT;UPPER

## 2021-03-10 ASSESSMENT — PAIN DESCRIPTION - LOCATION: LOCATION: ABDOMEN

## 2021-03-10 ASSESSMENT — PAIN DESCRIPTION - DESCRIPTORS: DESCRIPTORS: ACHING

## 2021-03-10 ASSESSMENT — PAIN DESCRIPTION - PAIN TYPE: TYPE: ACUTE PAIN

## 2021-03-11 ENCOUNTER — OFFICE VISIT (OUTPATIENT)
Dept: SURGERY | Age: 21
End: 2021-03-11
Payer: COMMERCIAL

## 2021-03-11 VITALS
SYSTOLIC BLOOD PRESSURE: 130 MMHG | BODY MASS INDEX: 32.28 KG/M2 | WEIGHT: 194 LBS | TEMPERATURE: 97.2 F | DIASTOLIC BLOOD PRESSURE: 70 MMHG

## 2021-03-11 DIAGNOSIS — K81.1 CHRONIC CHOLECYSTITIS: Primary | ICD-10-CM

## 2021-03-11 PROCEDURE — 1036F TOBACCO NON-USER: CPT | Performed by: SURGERY

## 2021-03-11 PROCEDURE — G8417 CALC BMI ABV UP PARAM F/U: HCPCS | Performed by: SURGERY

## 2021-03-11 PROCEDURE — 99203 OFFICE O/P NEW LOW 30 MIN: CPT | Performed by: SURGERY

## 2021-03-11 PROCEDURE — G8484 FLU IMMUNIZE NO ADMIN: HCPCS | Performed by: SURGERY

## 2021-03-11 PROCEDURE — G8427 DOCREV CUR MEDS BY ELIG CLIN: HCPCS | Performed by: SURGERY

## 2021-03-11 RX ORDER — AMOXICILLIN AND CLAVULANATE POTASSIUM 875; 125 MG/1; MG/1
1 TABLET, FILM COATED ORAL 2 TIMES DAILY
Qty: 20 TABLET | Refills: 0 | Status: SHIPPED | OUTPATIENT
Start: 2021-03-11 | End: 2021-03-18 | Stop reason: ALTCHOICE

## 2021-03-11 RX ORDER — HYDROCODONE BITARTRATE AND ACETAMINOPHEN 5; 325 MG/1; MG/1
1 TABLET ORAL EVERY 6 HOURS PRN
Qty: 10 TABLET | Refills: 0 | Status: SHIPPED | OUTPATIENT
Start: 2021-03-11 | End: 2021-03-14

## 2021-03-11 ASSESSMENT — ENCOUNTER SYMPTOMS
NAUSEA: 1
CHEST TIGHTNESS: 1
BACK PAIN: 1
ALLERGIC/IMMUNOLOGIC NEGATIVE: 1
EYES NEGATIVE: 1
ABDOMINAL PAIN: 1

## 2021-03-11 NOTE — PROGRESS NOTES
:     Regina Adams is a 21 y.o. female     CC: Right upper quadrant abdominal pain    HPI: 25-year-old female who presents for evaluation of a 4-month history of intermittent episodes of right upper quadrant abdominal pain. She was seen in the emergency room yesterday for severe right upper quadrant abdominal pain associated with nausea. The pain is described as sharp. Nothing makes it better or worse. No history of vomiting, fevers, chills, change in bowel habits or urinary symptoms. Past Medical History:   Diagnosis Date    Anemia     taking iron       Patient has no known allergies. Past Surgical History:   Procedure Laterality Date    TONSILLECTOMY          Prior to Visit Medications    Medication Sig Taking? Authorizing Provider   amoxicillin-clavulanate (AUGMENTIN) 875-125 MG per tablet Take 1 tablet by mouth 2 times daily for 10 days Yes Sushil Jones PA-C   HYDROcodone-acetaminophen (NORCO) 5-325 MG per tablet Take 1 tablet by mouth every 6 hours as needed for Pain for up to 3 days.  Yes Abram Mari   Escitalopram Oxalate (LEXAPRO PO) Take by mouth daily Yes Historical Provider, MD       Social History     Socioeconomic History    Marital status: Single     Spouse name: Not on file    Number of children: Not on file    Years of education: Not on file    Highest education level: Not on file   Occupational History    Not on file   Social Needs    Financial resource strain: Not on file    Food insecurity     Worry: Not on file     Inability: Not on file    Transportation needs     Medical: Not on file     Non-medical: Not on file   Tobacco Use    Smoking status: Former Smoker    Smokeless tobacco: Never Used   Substance and Sexual Activity    Alcohol use: Never     Frequency: Never    Drug use: Never    Sexual activity: Not Currently     Partners: Male   Lifestyle    Physical activity     Days per week: Not on file     Minutes per session: Not on file    person, place, and time. Psychiatric:         Behavior: Behavior normal.       Temp 97.2 °F (36.2 °C)   Wt 194 lb (88 kg)   BMI 32.28 kg/m²     :      19-year-old female who has had 2 severe episodes of right upper quadrant abdominal pain associated with nausea. One episode was about 4 months ago and the other episode was last night. In between episodes, she has had less severe symptoms. For both of the severe episodes, she presented to the emergency room. On both occasions the CAT scan showed findings consistent with acute cholecystitis. Ultrasound did not show gallstones. The patient's symptoms are consistent with chronic cholecystitis/gallbladder dysfunction. Plan:      Laparoscopic cholecystectomy. Patient explained the risks,benefits and possible complications including bleeding, bowel injury, cbd injury or hepatic artery injury.

## 2021-03-11 NOTE — ED PROVIDER NOTES
[address]        Pt Name: Zoraida Ivey  MRN: [id number]  [place] 2000  Date of evaluation: 3/10/2021  Provider: Akila Petty PA-C  PCP: No primary care provider on file. NAY. I have evaluated this patient. My supervising physician was available for consultation. CHIEF COMPLAINT       Chief Complaint   Patient presents with    Abdominal Pain     Pt with RUQ pain. Pt was in a few months ago with same pain, they told her it was her gallbladder. pt was going to have it removed, but did a repeat scan and they said it was fine. Pt started with same pain again two days ago and keeps getting worse. HISTORY OF PRESENT ILLNESS   (Location, Timing/Onset, Context/Setting, Quality, Duration, Modifying Factors, Severity, Associated Signs and Symptoms)  Note limiting factors. Zoraida Ivey is a 21 y.o. female who presents for evaluation of right upper quadrant abdominal pain over the past 2 to 3 days. States the pain comes and goes with no known aggravating alleviating factors. States that it is sharp and stabbing in nature. She states that she is seen for Advil and evaluated several months ago for similar complaints and was told that it was her gallbladder but states that she then followed up and was told that it was not her gallbladder. She denies vomiting or diarrhea. No fevers or chills. No history of any abdominal surgeries. She has no other complaints or concerns at this time. Nursing Notes were all reviewed and agreed with or any disagreements were addressed in the HPI. REVIEW OF SYSTEMS    (2-9 systems for level 4, 10 or more for level 5)     Review of Systems   Constitutional: Negative for appetite change, chills and fever. HENT: Negative for congestion and rhinorrhea. Respiratory: Negative for cough, shortness of breath and wheezing. Cardiovascular: Negative for chest pain.    Gastrointestinal: Positive for abdominal pain and nausea. Negative for diarrhea and vomiting. Genitourinary: Negative for difficulty urinating, dysuria and hematuria. Musculoskeletal: Negative for neck pain and neck stiffness. Skin: Negative for rash. Neurological: Negative for headaches. Positives and Pertinent negatives as per HPI. Except as noted above in the ROS, all other systems were reviewed and negative. PAST MEDICAL HISTORY     Past Medical History:   Diagnosis Date    Anemia     taking iron         SURGICAL HISTORY     Past Surgical History:   Procedure Laterality Date    TONSILLECTOMY           CURRENTMEDICATIONS       Previous Medications    ESCITALOPRAM OXALATE (LEXAPRO PO)    Take by mouth daily         ALLERGIES     Patient has no known allergies. FAMILYHISTORY       Family History   Problem Relation Age of Onset    Diabetes Father           SOCIAL HISTORY       Social History     Tobacco Use    Smoking status: Former Smoker    Smokeless tobacco: Never Used   Substance Use Topics    Alcohol use: Never     Frequency: Never    Drug use: Never       SCREENINGS             PHYSICAL EXAM    (up to 7 for level 4, 8 or more for level 5)     ED Triage Vitals [03/10/21 2030]   BP Temp Temp Source Pulse Resp SpO2 Height Weight   130/68 98.7 °F (37.1 °C) Oral 96 16 97 % 5' 5\" (1.651 m) 189 lb 9.6 oz (86 kg)       Physical Exam  Vitals signs and nursing note reviewed. Constitutional:       General: She is not in acute distress. Appearance: She is well-developed. She is not ill-appearing, toxic-appearing or diaphoretic. HENT:      Head: Normocephalic and atraumatic. Right Ear: External ear normal.      Left Ear: External ear normal.      Nose: Nose normal.   Eyes:      General:         Right eye: No discharge. Left eye: No discharge. Neck:      Musculoskeletal: Normal range of motion and neck supple. Cardiovascular:      Rate and Rhythm: Normal rate and regular rhythm. Performed at:  [facility]  [address] [facility], [address]   Phone [phone]   LIPASE    Narrative:     Performed at:  [facility]  [address] [facility], [address]   Phone [phone]   HCG, SERUM, QUALITATIVE    Narrative:     Performed at:  [facility]  [address] [facility], [address]   Phone [phone]       All other labs were within normal range or not returned as of this dictation. EKG: All EKG's are interpreted by the Emergency Department Physician in the absence of a cardiologist.  Please see their note for interpretation of EKG. RADIOLOGY:   Non-plain film images such as CT, Ultrasound and MRI are read by the radiologist. Plain radiographic images are visualized and preliminarily interpreted by the ED Provider with the below findings:        Interpretation per the Radiologist below, if available at the time of this note:    CT ABDOMEN PELVIS WO CONTRAST Additional Contrast? None   Final Result   1. Abnormal appearance to the gallbladder again visualized, with a small   amount of pericholecystic fluid and inflammation, suggesting cholecystitis. No calcified gallstones are present. Findings are similar compared to the   prior CT scan performed 6 months prior         US GALLBLADDER RUQ   Final Result   1. No evidence of cholelithiasis or cholecystitis   2. Poor visualization of the pancreas due to overlying bowel gas           No results found.         PROCEDURES   Unless otherwise noted below, none     Procedures    CRITICAL CARE TIME   N/A    CONSULTS:  IP CONSULT TO GENERAL SURGERY      EMERGENCY DEPARTMENT COURSE and DIFFERENTIAL DIAGNOSIS/MDM:   Vitals:    Vitals:    03/10/21 2030   BP: 130/68   Pulse: 96   Resp: 16   Temp: 98.7 °F (37.1 °C)   TempSrc: Oral   SpO2: 97%   Weight: 189 lb 9.6 oz (86 kg)   Height: 5' 5\" (1.651 m)       Patient was given the following medications:  Medications   aluminum & magnesium hydroxide-simethicone (MAALOX) 30 mL, lidocaine viscous hcl (XYLOCAINE) 5 mL (GI COCKTAIL) ( Oral Given 3/10/21 2307)   famotidine (PEPCID) tablet 40 mg (40 mg Oral Given 3/10/21 2307)           Patient presents for evaluation of right upper quadrant abdominal pain. On exam, she is resting comfortably in bed in no acute distress and nontoxic. Vitals are stable and she is afebrile. Lungs are clear to auscultation bilaterally. Has mild tenderness to right upper quadrant no peritoneal signs. Negative Concepcion sign. She declined need for any pain medication at this time and will be reevaluated. CBC and CMP are unremarkable. There is no leukocytosis. LFTs within normal limits. Lipase 58. Beta-hCG is negative. Urinalysis negative. CT abdomen pelvis shows abnormal appearance of gallbladder with small amount of pericholecystic fluid and inflammation suggesting cholecystitis. No calcified gallstones are present. Right upper quadrant ultrasound shows no evidence of cholelithiasis or cholecystitis. I spoke with the on-call general surgeon, Dr. Zaida Calvo, who states that he could admit the patient for pain control and HIDA scan in the morning or follow-up with her in the office. Patient states that she does not have anybody to watch her son and does not want to stay tonight. We used shared decision making. She would not have to sign out [address] She was given prescriptions for Augmentin and Norco for pain control and is to call the general surgeon tomorrow morning to have outpatient appointment tomorrow. I see nothing that would suggest an acute abdomen at this time.  Based on history, physical exam, risk factors, and tests my suspicion for bowel obstruction, incarcerated hernia, acute pancreatitis, intra-abdominal abscess, perforated viscus, diverticulitis, appendicitis, PID, ovarian torsion, ectopic pregnancy and tubo-ovarian abscess is very low. There is no evidence of peritonitis, sepsis or toxicity at this time. I feel the patient can be managed as an outpatient with follow-up with her family doctor in 24-48 hours. Instructions have been given for the patient to return to the ED for worsening of the pain, high fevers, intractable vomiting, or bleeding. She is agreeable to this plan and stable for discharge at this time. FINAL IMPRESSION      1. Cholecystitis          DISPOSITION/PLAN   DISPOSITION        PATIENT REFERREDTO:  Regi Grant MD  [address] [address]  [phone]    Schedule an appointment as soon as possible for a visit   Call the office tomorrow morning and tell them that Dr. Luis Antonio Rose wants you seen tomorrow. Dr. Tessa Ritter has office hours between 1 and 3 PM.    [facility] Emergency Department  [address] [place] [address]  [phone]  Go to   If symptoms worsen      DISCHARGE MEDICATIONS:  New Prescriptions    AMOXICILLIN-CLAVULANATE (AUGMENTIN) 875-125 MG PER TABLET    Take 1 tablet by mouth 2 times daily for 10 days    HYDROCODONE-ACETAMINOPHEN (NORCO) 5-325 MG PER TABLET    Take 1 tablet by mouth every 6 hours as needed for Pain for up to 3 days.        DISCONTINUED MEDICATIONS:  Discontinued Medications    No medications on file              (Please note that portions of this note were completed with a voice recognition program.  Efforts were made to edit the dictations but occasionally words are mis-transcribed.)    Osmar Washington PA-C (electronically signed)           Rosendo Echavarria PA-C  03/11/21 0004

## 2021-03-16 ENCOUNTER — TELEPHONE (OUTPATIENT)
Dept: SURGERY | Age: 21
End: 2021-03-16

## 2021-03-18 RX ORDER — HYDROCODONE BITARTRATE AND ACETAMINOPHEN 5; 325 MG/1; MG/1
1 TABLET ORAL EVERY 6 HOURS PRN
Status: ON HOLD | COMMUNITY
End: 2021-03-23 | Stop reason: HOSPADM

## 2021-03-19 ENCOUNTER — OFFICE VISIT (OUTPATIENT)
Dept: PRIMARY CARE CLINIC | Age: 21
End: 2021-03-19
Payer: COMMERCIAL

## 2021-03-19 DIAGNOSIS — Z20.828 EXPOSURE TO SARS-ASSOCIATED CORONAVIRUS: Primary | ICD-10-CM

## 2021-03-19 PROCEDURE — G8428 CUR MEDS NOT DOCUMENT: HCPCS | Performed by: NURSE PRACTITIONER

## 2021-03-19 PROCEDURE — 99211 OFF/OP EST MAY X REQ PHY/QHP: CPT | Performed by: NURSE PRACTITIONER

## 2021-03-19 PROCEDURE — G8417 CALC BMI ABV UP PARAM F/U: HCPCS | Performed by: NURSE PRACTITIONER

## 2021-03-20 LAB — SARS-COV-2: NOT DETECTED

## 2021-03-22 NOTE — PATIENT INSTRUCTIONS

## 2021-03-22 NOTE — PROGRESS NOTES
Keven Munguia received a viral test for COVID-19. They were educated on isolation and quarantine as appropriate. For any symptoms, they were directed to seek care from their PCP, given contact information to establish with a doctor, directed to an urgent care or the emergency room.

## 2021-03-23 ENCOUNTER — APPOINTMENT (OUTPATIENT)
Dept: GENERAL RADIOLOGY | Age: 21
End: 2021-03-23
Attending: SURGERY
Payer: COMMERCIAL

## 2021-03-23 ENCOUNTER — ANESTHESIA (OUTPATIENT)
Dept: OPERATING ROOM | Age: 21
End: 2021-03-23
Payer: COMMERCIAL

## 2021-03-23 ENCOUNTER — HOSPITAL ENCOUNTER (OUTPATIENT)
Age: 21
Setting detail: OUTPATIENT SURGERY
Discharge: HOME | End: 2021-03-23
Attending: SURGERY | Admitting: SURGERY
Payer: COMMERCIAL

## 2021-03-23 ENCOUNTER — ANESTHESIA EVENT (OUTPATIENT)
Dept: OPERATING ROOM | Age: 21
End: 2021-03-23
Payer: COMMERCIAL

## 2021-03-23 VITALS
OXYGEN SATURATION: 100 % | SYSTOLIC BLOOD PRESSURE: 148 MMHG | TEMPERATURE: 97.2 F | RESPIRATION RATE: 19 BRPM | DIASTOLIC BLOOD PRESSURE: 91 MMHG

## 2021-03-23 VITALS
HEART RATE: 88 BPM | DIASTOLIC BLOOD PRESSURE: 62 MMHG | OXYGEN SATURATION: 100 % | SYSTOLIC BLOOD PRESSURE: 108 MMHG | HEIGHT: 64 IN | TEMPERATURE: 97.5 F | RESPIRATION RATE: 14 BRPM | WEIGHT: 198.8 LBS | BODY MASS INDEX: 33.94 KG/M2

## 2021-03-23 DIAGNOSIS — K81.1 CHRONIC CHOLECYSTITIS: Primary | ICD-10-CM

## 2021-03-23 LAB — HCG(URINE) PREGNANCY TEST: NEGATIVE

## 2021-03-23 PROCEDURE — 6360000002 HC RX W HCPCS: Performed by: SURGERY

## 2021-03-23 PROCEDURE — 2580000003 HC RX 258: Performed by: ANESTHESIOLOGY

## 2021-03-23 PROCEDURE — 6370000000 HC RX 637 (ALT 250 FOR IP): Performed by: ANESTHESIOLOGY

## 2021-03-23 PROCEDURE — 2720000010 HC SURG SUPPLY STERILE: Performed by: SURGERY

## 2021-03-23 PROCEDURE — 7100000001 HC PACU RECOVERY - ADDTL 15 MIN: Performed by: SURGERY

## 2021-03-23 PROCEDURE — 6360000002 HC RX W HCPCS: Performed by: NURSE ANESTHETIST, CERTIFIED REGISTERED

## 2021-03-23 PROCEDURE — 2500000003 HC RX 250 WO HCPCS: Performed by: NURSE ANESTHETIST, CERTIFIED REGISTERED

## 2021-03-23 PROCEDURE — 7100000010 HC PHASE II RECOVERY - FIRST 15 MIN: Performed by: SURGERY

## 2021-03-23 PROCEDURE — 3600000004 HC SURGERY LEVEL 4 BASE: Performed by: SURGERY

## 2021-03-23 PROCEDURE — 3700000001 HC ADD 15 MINUTES (ANESTHESIA): Performed by: SURGERY

## 2021-03-23 PROCEDURE — 3600000014 HC SURGERY LEVEL 4 ADDTL 15MIN: Performed by: SURGERY

## 2021-03-23 PROCEDURE — 2500000003 HC RX 250 WO HCPCS: Performed by: SURGERY

## 2021-03-23 PROCEDURE — 6370000000 HC RX 637 (ALT 250 FOR IP): Performed by: NURSE ANESTHETIST, CERTIFIED REGISTERED

## 2021-03-23 PROCEDURE — 7100000011 HC PHASE II RECOVERY - ADDTL 15 MIN: Performed by: SURGERY

## 2021-03-23 PROCEDURE — 47562 LAPAROSCOPIC CHOLECYSTECTOMY: CPT | Performed by: SURGERY

## 2021-03-23 PROCEDURE — 3700000000 HC ANESTHESIA ATTENDED CARE: Performed by: SURGERY

## 2021-03-23 PROCEDURE — 2580000003 HC RX 258: Performed by: NURSE ANESTHETIST, CERTIFIED REGISTERED

## 2021-03-23 PROCEDURE — 88304 TISSUE EXAM BY PATHOLOGIST: CPT

## 2021-03-23 PROCEDURE — 6360000002 HC RX W HCPCS: Performed by: ANESTHESIOLOGY

## 2021-03-23 PROCEDURE — 7100000000 HC PACU RECOVERY - FIRST 15 MIN: Performed by: SURGERY

## 2021-03-23 PROCEDURE — 2709999900 HC NON-CHARGEABLE SUPPLY: Performed by: SURGERY

## 2021-03-23 PROCEDURE — 2580000003 HC RX 258: Performed by: SURGERY

## 2021-03-23 PROCEDURE — 84703 CHORIONIC GONADOTROPIN ASSAY: CPT

## 2021-03-23 RX ORDER — SODIUM CHLORIDE, SODIUM LACTATE, POTASSIUM CHLORIDE, CALCIUM CHLORIDE 600; 310; 30; 20 MG/100ML; MG/100ML; MG/100ML; MG/100ML
INJECTION, SOLUTION INTRAVENOUS CONTINUOUS PRN
Status: DISCONTINUED | OUTPATIENT
Start: 2021-03-23 | End: 2021-03-23 | Stop reason: SDUPTHER

## 2021-03-23 RX ORDER — SODIUM CHLORIDE 0.9 % (FLUSH) 0.9 %
10 SYRINGE (ML) INJECTION PRN
Status: DISCONTINUED | OUTPATIENT
Start: 2021-03-23 | End: 2021-03-23 | Stop reason: HOSPADM

## 2021-03-23 RX ORDER — HYDRALAZINE HYDROCHLORIDE 20 MG/ML
5 INJECTION INTRAMUSCULAR; INTRAVENOUS EVERY 10 MIN PRN
Status: DISCONTINUED | OUTPATIENT
Start: 2021-03-23 | End: 2021-03-23 | Stop reason: HOSPADM

## 2021-03-23 RX ORDER — ACETAMINOPHEN 325 MG/1
TABLET ORAL PRN
Status: DISCONTINUED | OUTPATIENT
Start: 2021-03-23 | End: 2021-03-23 | Stop reason: SDUPTHER

## 2021-03-23 RX ORDER — FENTANYL CITRATE 50 UG/ML
25 INJECTION, SOLUTION INTRAMUSCULAR; INTRAVENOUS EVERY 5 MIN PRN
Status: DISCONTINUED | OUTPATIENT
Start: 2021-03-23 | End: 2021-03-23 | Stop reason: HOSPADM

## 2021-03-23 RX ORDER — HYDROMORPHONE HCL 110MG/55ML
0.5 PATIENT CONTROLLED ANALGESIA SYRINGE INTRAVENOUS EVERY 5 MIN PRN
Status: DISCONTINUED | OUTPATIENT
Start: 2021-03-23 | End: 2021-03-23 | Stop reason: HOSPADM

## 2021-03-23 RX ORDER — SODIUM CHLORIDE 0.9 % (FLUSH) 0.9 %
10 SYRINGE (ML) INJECTION EVERY 12 HOURS SCHEDULED
Status: DISCONTINUED | OUTPATIENT
Start: 2021-03-23 | End: 2021-03-23 | Stop reason: HOSPADM

## 2021-03-23 RX ORDER — HYDROCODONE BITARTRATE AND ACETAMINOPHEN 5; 325 MG/1; MG/1
1-2 TABLET ORAL EVERY 4 HOURS PRN
Qty: 15 TABLET | Refills: 0 | Status: SHIPPED | OUTPATIENT
Start: 2021-03-23 | End: 2021-03-26

## 2021-03-23 RX ORDER — KETOROLAC TROMETHAMINE 30 MG/ML
INJECTION, SOLUTION INTRAMUSCULAR; INTRAVENOUS PRN
Status: DISCONTINUED | OUTPATIENT
Start: 2021-03-23 | End: 2021-03-23 | Stop reason: SDUPTHER

## 2021-03-23 RX ORDER — SODIUM CHLORIDE, SODIUM LACTATE, POTASSIUM CHLORIDE, CALCIUM CHLORIDE 600; 310; 30; 20 MG/100ML; MG/100ML; MG/100ML; MG/100ML
INJECTION, SOLUTION INTRAVENOUS CONTINUOUS PRN
Status: COMPLETED | OUTPATIENT
Start: 2021-03-23 | End: 2021-03-23

## 2021-03-23 RX ORDER — BUPIVACAINE HYDROCHLORIDE 5 MG/ML
INJECTION, SOLUTION EPIDURAL; INTRACAUDAL
Status: COMPLETED | OUTPATIENT
Start: 2021-03-23 | End: 2021-03-23

## 2021-03-23 RX ORDER — SCOLOPAMINE TRANSDERMAL SYSTEM 1 MG/1
PATCH, EXTENDED RELEASE TRANSDERMAL PRN
Status: DISCONTINUED | OUTPATIENT
Start: 2021-03-23 | End: 2021-03-23 | Stop reason: SDUPTHER

## 2021-03-23 RX ORDER — SCOLOPAMINE TRANSDERMAL SYSTEM 1 MG/1
PATCH, EXTENDED RELEASE TRANSDERMAL
Status: COMPLETED
Start: 2021-03-23 | End: 2021-03-23

## 2021-03-23 RX ORDER — LIDOCAINE HYDROCHLORIDE 20 MG/ML
INJECTION, SOLUTION EPIDURAL; INFILTRATION; INTRACAUDAL; PERINEURAL PRN
Status: DISCONTINUED | OUTPATIENT
Start: 2021-03-23 | End: 2021-03-23 | Stop reason: SDUPTHER

## 2021-03-23 RX ORDER — MIDAZOLAM HYDROCHLORIDE 1 MG/ML
INJECTION INTRAMUSCULAR; INTRAVENOUS PRN
Status: DISCONTINUED | OUTPATIENT
Start: 2021-03-23 | End: 2021-03-23 | Stop reason: SDUPTHER

## 2021-03-23 RX ORDER — SODIUM CHLORIDE, SODIUM LACTATE, POTASSIUM CHLORIDE, CALCIUM CHLORIDE 600; 310; 30; 20 MG/100ML; MG/100ML; MG/100ML; MG/100ML
INJECTION, SOLUTION INTRAVENOUS CONTINUOUS
Status: DISCONTINUED | OUTPATIENT
Start: 2021-03-23 | End: 2021-03-23 | Stop reason: HOSPADM

## 2021-03-23 RX ORDER — LIDOCAINE HYDROCHLORIDE 10 MG/ML
1 INJECTION, SOLUTION EPIDURAL; INFILTRATION; INTRACAUDAL; PERINEURAL
Status: DISCONTINUED | OUTPATIENT
Start: 2021-03-23 | End: 2021-03-23 | Stop reason: HOSPADM

## 2021-03-23 RX ORDER — PROPOFOL 10 MG/ML
INJECTION, EMULSION INTRAVENOUS PRN
Status: DISCONTINUED | OUTPATIENT
Start: 2021-03-23 | End: 2021-03-23 | Stop reason: SDUPTHER

## 2021-03-23 RX ORDER — VECURONIUM BROMIDE 1 MG/ML
INJECTION, POWDER, LYOPHILIZED, FOR SOLUTION INTRAVENOUS PRN
Status: DISCONTINUED | OUTPATIENT
Start: 2021-03-23 | End: 2021-03-23 | Stop reason: SDUPTHER

## 2021-03-23 RX ORDER — ONDANSETRON 2 MG/ML
INJECTION INTRAMUSCULAR; INTRAVENOUS PRN
Status: DISCONTINUED | OUTPATIENT
Start: 2021-03-23 | End: 2021-03-23 | Stop reason: SDUPTHER

## 2021-03-23 RX ORDER — DEXAMETHASONE SODIUM PHOSPHATE 4 MG/ML
INJECTION, SOLUTION INTRA-ARTICULAR; INTRALESIONAL; INTRAMUSCULAR; INTRAVENOUS; SOFT TISSUE PRN
Status: DISCONTINUED | OUTPATIENT
Start: 2021-03-23 | End: 2021-03-23 | Stop reason: SDUPTHER

## 2021-03-23 RX ORDER — ONDANSETRON 2 MG/ML
4 INJECTION INTRAMUSCULAR; INTRAVENOUS
Status: DISCONTINUED | OUTPATIENT
Start: 2021-03-23 | End: 2021-03-23 | Stop reason: HOSPADM

## 2021-03-23 RX ORDER — HYDROCODONE BITARTRATE AND ACETAMINOPHEN 5; 325 MG/1; MG/1
1 TABLET ORAL
Status: COMPLETED | OUTPATIENT
Start: 2021-03-23 | End: 2021-03-23

## 2021-03-23 RX ORDER — LABETALOL HYDROCHLORIDE 5 MG/ML
5 INJECTION, SOLUTION INTRAVENOUS EVERY 10 MIN PRN
Status: DISCONTINUED | OUTPATIENT
Start: 2021-03-23 | End: 2021-03-23 | Stop reason: HOSPADM

## 2021-03-23 RX ORDER — KETAMINE HCL IN NACL, ISO-OSM 100MG/10ML
SYRINGE (ML) INJECTION PRN
Status: DISCONTINUED | OUTPATIENT
Start: 2021-03-23 | End: 2021-03-23 | Stop reason: SDUPTHER

## 2021-03-23 RX ORDER — ACETAMINOPHEN 500 MG
TABLET ORAL
Status: COMPLETED
Start: 2021-03-23 | End: 2021-03-23

## 2021-03-23 RX ORDER — MAGNESIUM HYDROXIDE 1200 MG/15ML
LIQUID ORAL CONTINUOUS PRN
Status: COMPLETED | OUTPATIENT
Start: 2021-03-23 | End: 2021-03-23

## 2021-03-23 RX ORDER — PROCHLORPERAZINE EDISYLATE 5 MG/ML
5 INJECTION INTRAMUSCULAR; INTRAVENOUS
Status: DISCONTINUED | OUTPATIENT
Start: 2021-03-23 | End: 2021-03-23 | Stop reason: HOSPADM

## 2021-03-23 RX ORDER — MAGNESIUM SULFATE HEPTAHYDRATE 500 MG/ML
INJECTION, SOLUTION INTRAMUSCULAR; INTRAVENOUS PRN
Status: DISCONTINUED | OUTPATIENT
Start: 2021-03-23 | End: 2021-03-23 | Stop reason: SDUPTHER

## 2021-03-23 RX ORDER — FENTANYL CITRATE 50 UG/ML
INJECTION, SOLUTION INTRAMUSCULAR; INTRAVENOUS PRN
Status: DISCONTINUED | OUTPATIENT
Start: 2021-03-23 | End: 2021-03-23 | Stop reason: SDUPTHER

## 2021-03-23 RX ADMIN — MIDAZOLAM 2 MG: 1 INJECTION INTRAMUSCULAR; INTRAVENOUS at 15:19

## 2021-03-23 RX ADMIN — VECURONIUM BROMIDE 5 MG: 1 INJECTION, POWDER, LYOPHILIZED, FOR SOLUTION INTRAVENOUS at 15:22

## 2021-03-23 RX ADMIN — SUGAMMADEX 200 MG: 100 INJECTION, SOLUTION INTRAVENOUS at 16:00

## 2021-03-23 RX ADMIN — CEFAZOLIN SODIUM 2 G: 10 INJECTION, POWDER, FOR SOLUTION INTRAVENOUS at 15:10

## 2021-03-23 RX ADMIN — HYDROCODONE BITARTRATE AND ACETAMINOPHEN 1 TABLET: 5; 325 TABLET ORAL at 17:07

## 2021-03-23 RX ADMIN — FENTANYL CITRATE 100 MCG: 50 INJECTION, SOLUTION INTRAMUSCULAR; INTRAVENOUS at 15:31

## 2021-03-23 RX ADMIN — SODIUM CHLORIDE, POTASSIUM CHLORIDE, SODIUM LACTATE AND CALCIUM CHLORIDE: 600; 310; 30; 20 INJECTION, SOLUTION INTRAVENOUS at 13:10

## 2021-03-23 RX ADMIN — LIDOCAINE HYDROCHLORIDE 100 MG: 20 INJECTION, SOLUTION EPIDURAL; INFILTRATION; INTRACAUDAL; PERINEURAL at 15:21

## 2021-03-23 RX ADMIN — Medication 30 MG: at 15:20

## 2021-03-23 RX ADMIN — SODIUM CHLORIDE, POTASSIUM CHLORIDE, SODIUM LACTATE AND CALCIUM CHLORIDE: 600; 310; 30; 20 INJECTION, SOLUTION INTRAVENOUS at 15:59

## 2021-03-23 RX ADMIN — PROPOFOL 50 MG: 10 INJECTION, EMULSION INTRAVENOUS at 15:22

## 2021-03-23 RX ADMIN — HYDROMORPHONE HYDROCHLORIDE 0.5 MG: 2 INJECTION, SOLUTION INTRAMUSCULAR; INTRAVENOUS; SUBCUTANEOUS at 16:25

## 2021-03-23 RX ADMIN — SCOPALAMINE 1 PATCH: 1 PATCH, EXTENDED RELEASE TRANSDERMAL at 15:04

## 2021-03-23 RX ADMIN — ONDANSETRON 4 MG: 2 INJECTION INTRAMUSCULAR; INTRAVENOUS at 15:30

## 2021-03-23 RX ADMIN — MAGNESIUM SULFATE HEPTAHYDRATE 1 G: 500 INJECTION, SOLUTION INTRAMUSCULAR; INTRAVENOUS at 15:30

## 2021-03-23 RX ADMIN — SODIUM CHLORIDE, POTASSIUM CHLORIDE, SODIUM LACTATE AND CALCIUM CHLORIDE: 600; 310; 30; 20 INJECTION, SOLUTION INTRAVENOUS at 15:04

## 2021-03-23 RX ADMIN — KETOROLAC TROMETHAMINE 30 MG: 30 INJECTION, SOLUTION INTRAMUSCULAR; INTRAVENOUS at 15:56

## 2021-03-23 RX ADMIN — DEXAMETHASONE SODIUM PHOSPHATE 6 MG: 4 INJECTION, SOLUTION INTRAMUSCULAR; INTRAVENOUS at 15:30

## 2021-03-23 RX ADMIN — PROPOFOL 150 MG: 10 INJECTION, EMULSION INTRAVENOUS at 15:21

## 2021-03-23 RX ADMIN — HYDROMORPHONE HYDROCHLORIDE 0.5 MG: 2 INJECTION, SOLUTION INTRAMUSCULAR; INTRAVENOUS; SUBCUTANEOUS at 16:35

## 2021-03-23 RX ADMIN — ACETAMINOPHEN 1000 MG: 325 TABLET ORAL at 15:04

## 2021-03-23 ASSESSMENT — PULMONARY FUNCTION TESTS
PIF_VALUE: 21
PIF_VALUE: 21
PIF_VALUE: 14
PIF_VALUE: 21
PIF_VALUE: 13
PIF_VALUE: 18
PIF_VALUE: 18
PIF_VALUE: 21
PIF_VALUE: 21
PIF_VALUE: 1
PIF_VALUE: 14
PIF_VALUE: 21
PIF_VALUE: 10
PIF_VALUE: 21
PIF_VALUE: 31
PIF_VALUE: 17
PIF_VALUE: 14
PIF_VALUE: 14
PIF_VALUE: 28
PIF_VALUE: 15
PIF_VALUE: 2
PIF_VALUE: 21
PIF_VALUE: 3
PIF_VALUE: 21

## 2021-03-23 ASSESSMENT — PAIN DESCRIPTION - PAIN TYPE: TYPE: SURGICAL PAIN

## 2021-03-23 ASSESSMENT — ENCOUNTER SYMPTOMS: SHORTNESS OF BREATH: 0

## 2021-03-23 NOTE — PROGRESS NOTES
CLINICAL PHARMACY NOTE: MEDS TO [address] Select Patient?: No  Total # of Prescriptions Filled: 1   The following medications were delivered to the patient:  · Norco 5-325mg  Total # of Interventions Completed: 0  Time Spent (min): 15    Additional Documentation:    Delivered to Patient pat Partida CPhT
Education complete. Patient and boyfriend verbalize understanding and deny questions.
IV D/C'd without complication. Patient left via WC with belongings and boyfriend in stable condition.
Patient alert, VSS and O2 sat maintained on Ra. Pain controlled with prn meds. Abdominal incisions x4 CDI with skin glue. Patient tolerating PO. Patient meets all phase 1 discharge criteria, seen by anesthesia. Will transfer to phase 2 care.
Patient transferred from OR to PACU, VSS and O2 sat maintained on 6L via simple mask, oral airway in place. Patient unresponsive to stimuli at this time. Abdominal incisions x4 CDI with skin glue. Continue to monitor.
Teaching / education initiated regarding perioperative experience, expectations, and pain management during stay. Patient verbalized understanding.
our web site for additional information:  [employer]/patient-eprep              20.During flu season no children under the age of 15 are permitted in the hospital for the safety of all patients. 21. If you take a long acting insulin in the evening only  take half of your usual  dose the night  before your procedure              22. If you use a c-pap please bring DOS if staying overnight,             23.For your convenience [facility] has a pharmacy on site to fill your prescriptions. 24. If you use oxygen and have a portable tank please bring it  with you the DOS             25. Bring a complete list of all your medications with name and dose include any supplements. 26. Other__________________________________________   *Please call pre admission testing if you any further questions   [facility]   [address]    [place] [address]  [phone]   [place]    __ Will  _____  _x_ Scheduled _3/19__ Where _mff__   __ Other __________      HJORUGL POLICY(subject to change)    There is a one visitor policy at [facility] for all surgeries and endoscopies. Whether the visitor can stay or will be asked to wait in the car will depend on the current policy and if social distancing can be maintained. The policy is subject to change at any time. Please make sure the visitor has a cell phone that is on,charged and able to accept calls, as this may be the way that the staff communicates with them. Pain management is NO VISITOR policyThe patients ride is expected to remain in the car with a cell phone for communication. If the ride is leaving the hospital grounds please make sure they are back in time for pickup. Have the patient inform the staff on arrival what their rides plans are while the patient is in the facility. At the MAIN there is one visitor allowed. Please note that the visitor policy is subject to

## 2021-03-23 NOTE — ANESTHESIA PRE PROCEDURE
Department of Anesthesiology  Preprocedure Note       Name:  Emery Aguiar   Age:  21 y.o.  :  2000                                          MRN:  [id number]         Date:  3/23/2021      Surgeon: Bianka Arzolar):  Gela Guevara MD    Procedure: Procedure(s):  LAPAROSCOPIC CHOLECYSTECTOMY  WITH CHOLANGIOGRAM    Medications prior to admission:   Prior to Admission medications    Medication Sig Start Date End Date Taking? Authorizing Provider   HYDROcodone-acetaminophen (NORCO) 5-325 MG per tablet Take 1 tablet by mouth every 6 hours as needed for Pain. Yes Historical Provider, MD   Escitalopram Oxalate (LEXAPRO PO) Take 10 mg by mouth daily    Yes Historical Provider, MD       Current medications:    No current facility-administered medications for this encounter.         Allergies:  No Known Allergies    Problem List:    Patient Active Problem List   Diagnosis Code    Encounter for elective induction of labor Z34.90    Normal labor O80, Z37.9       Past Medical History:        Diagnosis Date    Anemia     taking iron       Past Surgical History:        Procedure Laterality Date    TONSILLECTOMY         Social History:    Social History     Tobacco Use    Smoking status: Former Smoker    Smokeless tobacco: Never Used   Substance Use Topics    Alcohol use: Never     Frequency: Never                                Counseling given: Not Answered      Vital Signs (Current):   Vitals:    21 1544 21 1246   BP:  122/63   Pulse:  84   Resp:  16   Temp:  98.1 °F (36.7 °C)   TempSrc:  Temporal   SpO2:  100%   Weight: 185 lb (83.9 kg) 198 lb 12.8 oz (90.2 kg)   Height: 5' 4\" (1.626 m) 5' 4\" (1.626 m)                                              BP Readings from Last 3 Encounters:   21 122/63   21 130/70   03/10/21 130/68       NPO Status: Time of last liquid consumption: 2300                        Time of last solid consumption: 2300                        Date of last liquid consumption: 03/22/21                        Date of last solid food consumption: 03/22/21    BMI:   Wt Readings from Last 3 Encounters:   03/23/21 198 lb 12.8 oz (90.2 kg)   03/11/21 194 lb (88 kg)   03/10/21 189 lb 9.6 oz (86 kg)     Body mass index is 34.12 kg/m². CBC:   Lab Results   Component Value Date    WBC 8.3 03/10/2021    RBC 4.43 03/10/2021    HGB 11.9 03/10/2021    HCT 35.3 03/10/2021    MCV 79.7 03/10/2021    RDW 16.2 03/10/2021     03/10/2021       CMP:   Lab Results   Component Value Date     03/10/2021    K 4.1 03/10/2021     03/10/2021    CO2 24 03/10/2021    BUN 11 03/10/2021    CREATININE 0.7 03/10/2021    GFRAA >60 03/10/2021    AGRATIO 1.3 03/10/2021    LABGLOM >60 03/10/2021    GLUCOSE 118 03/10/2021    PROT 7.7 03/10/2021    CALCIUM 9.3 03/10/2021    BILITOT <0.2 03/10/2021    ALKPHOS 60 03/10/2021    AST 19 03/10/2021    ALT 25 03/10/2021       POC Tests: No results for input(s): POCGLU, POCNA, POCK, POCCL, POCBUN, POCHEMO, POCHCT in the last 72 hours.     Coags: No results found for: PROTIME, INR, APTT    HCG (If Applicable):   Lab Results   Component Value Date    PREGTESTUR Negative 03/23/2021        ABGs: No results found for: PHART, PO2ART, CKB4BNZ, EPI0NHO, BEART, X7KIMFVE     Type & Screen (If Applicable):  No results found for: LABABO, LABRH    Drug/Infectious Status (If Applicable):  No results found for: HIV, HEPCAB    COVID-19 Screening (If Applicable):   Lab Results   Component Value Date    COVID19 Not Detected 03/19/2021    COVID19 Not Detected 04/18/2020           Anesthesia Evaluation  Patient summary reviewed and Nursing notes reviewed no history of anesthetic complications:   Airway: Mallampati: II  TM distance: >3 FB   Neck ROM: full  Mouth opening: > = 3 FB Dental: normal exam         Pulmonary:       (-) asthma and shortness of breath                           Cardiovascular:        (-) hypertension and  angina                Neuro/Psych:      (-)

## 2021-03-23 NOTE — ANESTHESIA POSTPROCEDURE EVALUATION
Department of Anesthesiology  Postprocedure Note    Patient: Sofi Paredes  MRN: [id number]  YOB: 2000  Date of evaluation: 3/23/2021  Time:  4:45 PM     Procedure Summary     Date: 03/23/21 Room / Location: [address]    Anesthesia Start: 5877 Anesthesia Stop: 1619    Procedure: LAPAROSCOPIC CHOLECYSTECTOMY (N/A ) Diagnosis: (K81.1 CHRONIC CHOLECYSTITIS)    Surgeons: Yaw Tesfaye MD Responsible Provider: Jacqueline Roche MD    Anesthesia Type: general ASA Status: 2          Anesthesia Type: general    Chris Phase I: Chris Score: 5    Chris Phase II:      Last vitals: Reviewed and per EMR flowsheets.        Anesthesia Post Evaluation    Patient location during evaluation: PACU  Patient participation: complete - patient participated  Level of consciousness: awake and alert  Airway patency: patent  Nausea & Vomiting: no nausea and no vomiting  Complications: no  Cardiovascular status: hemodynamically stable  Respiratory status: acceptable  Hydration status: stable

## 2021-03-23 NOTE — H&P
[facility] 83 and Laparoscopic Surgery  History and Physical Update          I have reviewed the history and physical and examined the patient and find no relevant changes. I have reviewed with the patient and/or family the risks, benefits, and alternatives to the procedure.     /63   Pulse 84   Temp 98.1 °F (36.7 °C) (Temporal)   Resp 16   Ht 5' 4\" (1.626 m)   Wt 198 lb 12.8 oz (90.2 kg)   SpO2 100%   BMI 34.12 kg/m²     Malena Hu MD  3/23/2021

## 2021-03-24 NOTE — OP NOTE
[facility]                     [facility], [address] [place] [address]                                OPERATIVE REPORT    PATIENT NAME: Vinicio Davila                       :        2000  MED REC NO:   [id number]                          ROOM:  ACCOUNT NO:   [de-identified]                           ADMIT DATE: 2021  PROVIDER:     Yvonne Terry MD    DATE OF PROCEDURE:  2021    PREOPERATIVE DIAGNOSIS:  Chronic cholecystitis. POSTOPERATIVE DIAGNOSIS:  Chronic cholecystitis. PROCEDURE:  Laparoscopic cholecystectomy. SURGEON:  Yvonne Terry MD.    ANESTHESIA:  General endotracheal and local.    ESTIMATED BLOOD LOSS:  Minimal    COMPLICATIONS:  None. SPECIMEN:  Gallbladder. OPERATIVE INDICATIONS AND CONSENT:  The patient is a 25-year-old female  with a four-month history of intermittent episodes of right upper  quadrant abdominal pain. She has been seen in the emergency room twice  where CAT scan showed findings consistent with acute cholecystitis. Ultrasound did not show gallstones. The patient's symptoms were felt to  be consistent with chronic cholecystitis/gallbladder dysfunction. She  is brought to the operating room today for laparoscopic cholecystectomy. She was explained the risks, benefits and possible complications  including risk of bleeding, bowel injury, bile duct injury or vascular  injury to the liver. DETAILS OF THE PROCEDURE:  The patient was brought to the operative  suite, placed in supine position on the operative table. After general  endotracheal anesthesia, she was prepped and draped in the usual sterile  fashion. We made a 5-mm curvilinear incision at the umbilicus. A Veress needle  was passed into the peritoneal cavity and after adequate insufflation, a  5-mm OptiView trocar was placed at this site. An 11-mm subxiphoid  trocar was placed followed by two 5-mm trocars in the right upper  quadrant.   The gallbladder had quite a bit of adhesions to the  undersurface. The adhesions consisted of omentum and were taken down  with blunt dissection and cautery. Dissection was then undertaken in  the hilar region. The cystic artery was the most anteriorly lying  structure. It was dissected free circumferentially, then doubly clipped  proximally, single clipped distally and divided. The cystic duct was  then dissected free circumferentially. A clip was placed at the  gallbladder cystic duct junction. A small transverse opening was then  made in the duct. A cholangiogram catheter was brought through a  separate stab incision in the right upper quadrant. It was able to be  easily placed into the duct, but was unable to be advanced with multiple  attempts. We eventually aborted the cholangiography. The patient's  anatomy was clear. The cystic duct was doubly clipped distally before  it was completely transected. The gallbladder was taken off of the  liver bed with Bovie electrocautery, placed in an EndoCatch bag and then  removed through the subxiphoid trocar site. Trocars were inserted and  the liver bed was inspected for bleeding or bile leak, which there was  none. All the trocars were removed under direct visualization and the abdomen  was de-insufflated. The trocar sites had all been injected with 0.5%  Marcaine with epinephrine. The skin of the incisions were closed with  running 4-0 subcuticular sutures. Dermabond was then applied. The  patient tolerated the procedure without difficulty and was transferred  to recovery room in stable condition. Roshan Shrestha MD    D: 03/23/2021 16:32:50       T: 03/23/2021 16:39:31     [id number]  Job#: [id number]     Doc#: [id number]    CC:   Claribel Wall Pa-C

## (undated) DEVICE — STERILE POLYISOPRENE POWDER-FREE SURGICAL GLOVES: Brand: PROTEXIS

## (undated) DEVICE — TROCAR: Brand: KII FIOS FIRST ENTRY

## (undated) DEVICE — CATHETER CHOLGM 4.5FR L18IN W/ MTL SUPP TB

## (undated) DEVICE — SET EXTN PRIMING 4.9ML L30IN INCL SLDE CLMP SPIN M LUERLOCK

## (undated) DEVICE — ADHESIVE SKIN CLSR 0.7ML TOP DERMBND ADV

## (undated) DEVICE — DRAPE C ARM UNIV W41XL74IN CLR PLAS XR VELC CLSR POLY STRP

## (undated) DEVICE — SOLUTION IV IRRIG POUR BRL 0.9% SODIUM CHL 2F7124

## (undated) DEVICE — CORD ES L10FT MPLR LAP

## (undated) DEVICE — LAPAROSCOPIC SCISSORS: Brand: EPIX LAPAROSCOPIC SCISSORS

## (undated) DEVICE — TROCAR: Brand: KII® SLEEVE

## (undated) DEVICE — PLUMEPORT LAPAROSCOPIC SMOKE FILTRATION DEVICE: Brand: PLUMEPORT ACTIV

## (undated) DEVICE — CHLORAPREP 26ML ORANGE

## (undated) DEVICE — TISSUE RETRIEVAL SYSTEM: Brand: INZII RETRIEVAL SYSTEM

## (undated) DEVICE — SYRINGE MED 30ML STD CLR PLAS LUERLOCK TIP N CTRL DISP

## (undated) DEVICE — NEEDLE HYPO 22GA L1.5IN BLK POLYPR HUB S STL REG BVL STR

## (undated) DEVICE — SUTURE VCRL SZ 4-0 L18IN ABSRB UD L19MM PS-2 3/8 CIR PRIM J496H

## (undated) DEVICE — CATHETER CHOLANGIOGRAM 4.5 FRX3 IN W/ 20018M55 TAUT INTRO

## (undated) DEVICE — LAPAROSCOPY PACK: Brand: MEDLINE INDUSTRIES, INC.

## (undated) DEVICE — MERCY FAIRFIELD TURNOVER KIT: Brand: MEDLINE INDUSTRIES, INC.

## (undated) DEVICE — Device

## (undated) DEVICE — NEEDLE INSUF L120MM DIA2MM DISP FOR PNEUMOPERI ENDOPATH

## (undated) DEVICE — SUTURE VCRL SZ 0 L27IN ABSRB VLT L22MM CT-3 1/2 CIR J329H

## (undated) DEVICE — SYRINGE, LUER LOCK, 10ML: Brand: MEDLINE

## (undated) DEVICE — APPLIER CLP M/L SHFT DIA5MM 15 LIG LIGAMAX 5

## (undated) DEVICE — DRAPE,LAP,CHOLE,W/TROUGHS,STERILE: Brand: MEDLINE